# Patient Record
Sex: FEMALE | Race: WHITE | Employment: UNEMPLOYED
[De-identification: names, ages, dates, MRNs, and addresses within clinical notes are randomized per-mention and may not be internally consistent; named-entity substitution may affect disease eponyms.]

---

## 2023-09-14 ENCOUNTER — OFFICE VISIT (OUTPATIENT)
Dept: FAMILY MEDICINE CLINIC | Facility: CLINIC | Age: 33
End: 2023-09-14

## 2023-09-14 VITALS
WEIGHT: 139.6 LBS | OXYGEN SATURATION: 98 % | SYSTOLIC BLOOD PRESSURE: 104 MMHG | HEIGHT: 60 IN | BODY MASS INDEX: 27.41 KG/M2 | TEMPERATURE: 98.3 F | DIASTOLIC BLOOD PRESSURE: 72 MMHG | HEART RATE: 96 BPM

## 2023-09-14 DIAGNOSIS — Z72.51 HIGH RISK SEXUAL BEHAVIOR, UNSPECIFIED TYPE: ICD-10-CM

## 2023-09-14 DIAGNOSIS — M79.641 BILATERAL HAND PAIN: Primary | ICD-10-CM

## 2023-09-14 DIAGNOSIS — R10.31 RIGHT LOWER QUADRANT ABDOMINAL PAIN: ICD-10-CM

## 2023-09-14 DIAGNOSIS — M79.642 BILATERAL HAND PAIN: Primary | ICD-10-CM

## 2023-09-14 DIAGNOSIS — F43.10 PTSD (POST-TRAUMATIC STRESS DISORDER): ICD-10-CM

## 2023-09-14 DIAGNOSIS — E04.1 THYROID NODULE: ICD-10-CM

## 2023-09-14 DIAGNOSIS — M79.642 BILATERAL HAND PAIN: ICD-10-CM

## 2023-09-14 DIAGNOSIS — M79.641 BILATERAL HAND PAIN: ICD-10-CM

## 2023-09-14 LAB
ALBUMIN SERPL-MCNC: 4.2 G/DL (ref 3.5–5)
ALBUMIN/GLOB SERPL: 1.4 (ref 0.4–1.6)
ALP SERPL-CCNC: 93 U/L (ref 50–136)
ALT SERPL-CCNC: 21 U/L (ref 12–65)
ANION GAP SERPL CALC-SCNC: 6 MMOL/L (ref 2–11)
AST SERPL-CCNC: 17 U/L (ref 15–37)
BASOPHILS # BLD: 0 K/UL (ref 0–0.2)
BASOPHILS NFR BLD: 0 % (ref 0–2)
BILIRUB SERPL-MCNC: 0.5 MG/DL (ref 0.2–1.1)
BUN SERPL-MCNC: 11 MG/DL (ref 6–23)
CALCIUM SERPL-MCNC: 9.2 MG/DL (ref 8.3–10.4)
CHLORIDE SERPL-SCNC: 107 MMOL/L (ref 101–110)
CO2 SERPL-SCNC: 28 MMOL/L (ref 21–32)
CREAT SERPL-MCNC: 0.8 MG/DL (ref 0.6–1)
DIFFERENTIAL METHOD BLD: ABNORMAL
EOSINOPHIL # BLD: 0.1 K/UL (ref 0–0.8)
EOSINOPHIL NFR BLD: 3 % (ref 0.5–7.8)
ERYTHROCYTE [DISTWIDTH] IN BLOOD BY AUTOMATED COUNT: 11.7 % (ref 11.9–14.6)
GLOBULIN SER CALC-MCNC: 3.1 G/DL (ref 2.8–4.5)
GLUCOSE SERPL-MCNC: 92 MG/DL (ref 65–100)
HCT VFR BLD AUTO: 40.5 % (ref 35.8–46.3)
HGB BLD-MCNC: 13.6 G/DL (ref 11.7–15.4)
HIV 1+2 AB+HIV1 P24 AG SERPL QL IA: NONREACTIVE
HIV 1/2 RESULT COMMENT: NORMAL
IMM GRANULOCYTES # BLD AUTO: 0 K/UL (ref 0–0.5)
IMM GRANULOCYTES NFR BLD AUTO: 0 % (ref 0–5)
LYMPHOCYTES # BLD: 1 K/UL (ref 0.5–4.6)
LYMPHOCYTES NFR BLD: 20 % (ref 13–44)
MCH RBC QN AUTO: 31.1 PG (ref 26.1–32.9)
MCHC RBC AUTO-ENTMCNC: 33.6 G/DL (ref 31.4–35)
MCV RBC AUTO: 92.5 FL (ref 82–102)
MONOCYTES # BLD: 0.2 K/UL (ref 0.1–1.3)
MONOCYTES NFR BLD: 5 % (ref 4–12)
NEUTS SEG # BLD: 3.6 K/UL (ref 1.7–8.2)
NEUTS SEG NFR BLD: 72 % (ref 43–78)
NRBC # BLD: 0 K/UL (ref 0–0.2)
PLATELET # BLD AUTO: 214 K/UL (ref 150–450)
PMV BLD AUTO: 10.6 FL (ref 9.4–12.3)
POTASSIUM SERPL-SCNC: 4.2 MMOL/L (ref 3.5–5.1)
PROT SERPL-MCNC: 7.3 G/DL (ref 6.3–8.2)
RBC # BLD AUTO: 4.38 M/UL (ref 4.05–5.2)
SODIUM SERPL-SCNC: 141 MMOL/L (ref 133–143)
TSH W FREE THYROID IF ABNORMAL: 0.67 UIU/ML (ref 0.36–3.74)
WBC # BLD AUTO: 4.9 K/UL (ref 4.3–11.1)

## 2023-09-14 PROCEDURE — 99204 OFFICE O/P NEW MOD 45 MIN: CPT | Performed by: FAMILY MEDICINE

## 2023-09-14 RX ORDER — GABAPENTIN 100 MG/1
100 CAPSULE ORAL 3 TIMES DAILY
Qty: 90 CAPSULE | Refills: 5 | Status: SHIPPED | OUTPATIENT
Start: 2023-09-14 | End: 2024-03-12

## 2023-09-14 RX ORDER — PRAZOSIN HYDROCHLORIDE 2 MG/1
2 CAPSULE ORAL NIGHTLY
COMMUNITY

## 2023-09-14 RX ORDER — BUPRENORPHINE AND NALOXONE 8; 2 MG/1; MG/1
1 FILM, SOLUBLE BUCCAL; SUBLINGUAL 2 TIMES DAILY
COMMUNITY

## 2023-09-14 SDOH — ECONOMIC STABILITY: FOOD INSECURITY: WITHIN THE PAST 12 MONTHS, YOU WORRIED THAT YOUR FOOD WOULD RUN OUT BEFORE YOU GOT MONEY TO BUY MORE.: NEVER TRUE

## 2023-09-14 SDOH — ECONOMIC STABILITY: FOOD INSECURITY: WITHIN THE PAST 12 MONTHS, THE FOOD YOU BOUGHT JUST DIDN'T LAST AND YOU DIDN'T HAVE MONEY TO GET MORE.: NEVER TRUE

## 2023-09-14 SDOH — ECONOMIC STABILITY: HOUSING INSECURITY
IN THE LAST 12 MONTHS, WAS THERE A TIME WHEN YOU DID NOT HAVE A STEADY PLACE TO SLEEP OR SLEPT IN A SHELTER (INCLUDING NOW)?: NO

## 2023-09-14 SDOH — ECONOMIC STABILITY: INCOME INSECURITY: HOW HARD IS IT FOR YOU TO PAY FOR THE VERY BASICS LIKE FOOD, HOUSING, MEDICAL CARE, AND HEATING?: NOT HARD AT ALL

## 2023-09-14 ASSESSMENT — PATIENT HEALTH QUESTIONNAIRE - PHQ9
SUM OF ALL RESPONSES TO PHQ QUESTIONS 1-9: 12
7. TROUBLE CONCENTRATING ON THINGS, SUCH AS READING THE NEWSPAPER OR WATCHING TELEVISION: 3
9. THOUGHTS THAT YOU WOULD BE BETTER OFF DEAD, OR OF HURTING YOURSELF: 0
SUM OF ALL RESPONSES TO PHQ9 QUESTIONS 1 & 2: 3
3. TROUBLE FALLING OR STAYING ASLEEP: 3
5. POOR APPETITE OR OVEREATING: 0
10. IF YOU CHECKED OFF ANY PROBLEMS, HOW DIFFICULT HAVE THESE PROBLEMS MADE IT FOR YOU TO DO YOUR WORK, TAKE CARE OF THINGS AT HOME, OR GET ALONG WITH OTHER PEOPLE: 1
SUM OF ALL RESPONSES TO PHQ QUESTIONS 1-9: 12
2. FEELING DOWN, DEPRESSED OR HOPELESS: 3
4. FEELING TIRED OR HAVING LITTLE ENERGY: 3
1. LITTLE INTEREST OR PLEASURE IN DOING THINGS: 0
6. FEELING BAD ABOUT YOURSELF - OR THAT YOU ARE A FAILURE OR HAVE LET YOURSELF OR YOUR FAMILY DOWN: 0
8. MOVING OR SPEAKING SO SLOWLY THAT OTHER PEOPLE COULD HAVE NOTICED. OR THE OPPOSITE, BEING SO FIGETY OR RESTLESS THAT YOU HAVE BEEN MOVING AROUND A LOT MORE THAN USUAL: 0

## 2023-09-14 ASSESSMENT — ENCOUNTER SYMPTOMS
WHEEZING: 0
DIARRHEA: 0
CONSTIPATION: 0
CHEST TIGHTNESS: 0
ABDOMINAL PAIN: 1
SHORTNESS OF BREATH: 0

## 2023-09-14 NOTE — PROGRESS NOTES
SOFT TISSUE THYROID; Future  3. High risk sexual behavior, unspecified type  -     Hepatitis C Ab, Rflx to Qt by PCR; Future  -     HIV 1/2 Ag/Ab, 4TH Generation,W Rflx Confirm; Future  -     C.trachomatis N.gonorrhoeae DNA; Future  -     RPR Reflex to Titer and TPPA; Future  4. Right lower quadrant abdominal pain  Concern for hernia  -     CT ABDOMEN PELVIS W IV CONTRAST Additional Contrast? Radiologist Recommendation; Future  5. PTSD (post-traumatic stress disorder)  Referral to psychiatry  -     [address] [place] [address] (Psychiatry)       Patient informed, we will call with blood work results within one week. If you have not heard regarding results in over a week, please contact office. You can also review results on [facility].            Prabhu Alvarado MD

## 2023-09-15 LAB
CCP IGA+IGG SERPL IA-ACNC: <1 UNITS (ref 0–19)
RHEUMATOID FACT SER QL LA: NEGATIVE
RPR SER QL: NONREACTIVE

## 2023-09-16 LAB
ANA SER QL: POSITIVE
CENTROMERE B AB SER-ACNC: <0.2 AI (ref 0–0.9)
CHROMATIN AB SERPL-ACNC: <0.2 AI (ref 0–0.9)
DSDNA AB SER-ACNC: 15 IU/ML (ref 0–9)
ENA JO1 AB SER-ACNC: <0.2 AI (ref 0–0.9)
ENA RNP AB SER-ACNC: <0.2 AI (ref 0–0.9)
ENA SCL70 AB SER-ACNC: <0.2 AI (ref 0–0.9)
ENA SM AB SER-ACNC: <0.2 AI (ref 0–0.9)
ENA SS-A AB SER-ACNC: <0.2 AI (ref 0–0.9)
ENA SS-B AB SER-ACNC: <0.2 AI (ref 0–0.9)
Lab: ABNORMAL

## 2023-09-17 LAB
C TRACH RRNA SPEC QL NAA+PROBE: NEGATIVE
N GONORRHOEA RRNA SPEC QL NAA+PROBE: NEGATIVE
SPECIMEN SOURCE: NORMAL

## 2023-09-20 LAB
HCV IGG SERPL QL IA: REACTIVE S/CO RATIO
HCV RNA SERPL NAA+PROBE-ACNC: NORMAL IU/ML
INTERPRETATION: NORMAL
REF LAB TEST REF RANGE: NORMAL

## 2023-10-11 ENCOUNTER — OFFICE VISIT (OUTPATIENT)
Dept: BEHAVIORAL/MENTAL HEALTH CLINIC | Age: 33
End: 2023-10-11

## 2023-10-11 VITALS
TEMPERATURE: 98 F | OXYGEN SATURATION: 98 % | HEART RATE: 87 BPM | HEIGHT: 60 IN | BODY MASS INDEX: 28.51 KG/M2 | SYSTOLIC BLOOD PRESSURE: 129 MMHG | WEIGHT: 145.2 LBS | DIASTOLIC BLOOD PRESSURE: 76 MMHG

## 2023-10-11 DIAGNOSIS — F51.5 NIGHTMARES ASSOCIATED WITH CHRONIC POST-TRAUMATIC STRESS DISORDER: ICD-10-CM

## 2023-10-11 DIAGNOSIS — F41.9 ANXIETY: ICD-10-CM

## 2023-10-11 DIAGNOSIS — R76.8 POSITIVE ANA (ANTINUCLEAR ANTIBODY): ICD-10-CM

## 2023-10-11 DIAGNOSIS — F43.12 NIGHTMARES ASSOCIATED WITH CHRONIC POST-TRAUMATIC STRESS DISORDER: ICD-10-CM

## 2023-10-11 DIAGNOSIS — F51.04 PSYCHOPHYSIOLOGICAL INSOMNIA: ICD-10-CM

## 2023-10-11 DIAGNOSIS — F43.10 PTSD (POST-TRAUMATIC STRESS DISORDER): Primary | ICD-10-CM

## 2023-10-11 RX ORDER — AMITRIPTYLINE HYDROCHLORIDE 25 MG/1
25 TABLET, FILM COATED ORAL
COMMUNITY
Start: 2023-09-28 | End: 2023-10-11 | Stop reason: DRUGHIGH

## 2023-10-11 RX ORDER — AMITRIPTYLINE HYDROCHLORIDE 50 MG/1
50 TABLET, FILM COATED ORAL NIGHTLY
Qty: 30 TABLET | Refills: 3 | Status: SHIPPED | OUTPATIENT
Start: 2023-10-11 | End: 2023-11-10

## 2023-10-11 RX ORDER — FLUOXETINE HYDROCHLORIDE 20 MG/1
20 CAPSULE ORAL EVERY MORNING
Qty: 30 CAPSULE | Refills: 3 | Status: SHIPPED | OUTPATIENT
Start: 2023-10-11 | End: 2023-11-10

## 2023-10-11 RX ORDER — PRAZOSIN HYDROCHLORIDE 2 MG/1
4 CAPSULE ORAL NIGHTLY
Qty: 60 CAPSULE | Refills: 3 | Status: SHIPPED | OUTPATIENT
Start: 2023-10-11 | End: 2023-11-10

## 2023-10-12 PROBLEM — F51.04 PSYCHOPHYSIOLOGICAL INSOMNIA: Status: ACTIVE | Noted: 2023-10-12

## 2023-10-12 PROBLEM — F51.5 NIGHTMARES ASSOCIATED WITH CHRONIC POST-TRAUMATIC STRESS DISORDER: Status: ACTIVE | Noted: 2023-10-12

## 2023-10-12 PROBLEM — F43.12 NIGHTMARES ASSOCIATED WITH CHRONIC POST-TRAUMATIC STRESS DISORDER: Status: ACTIVE | Noted: 2023-10-12

## 2023-10-12 PROBLEM — F41.9 ANXIETY: Status: ACTIVE | Noted: 2023-10-12

## 2023-10-13 ENCOUNTER — TELEPHONE (OUTPATIENT)
Dept: FAMILY MEDICINE CLINIC | Facility: CLINIC | Age: 33
End: 2023-10-13

## 2023-10-13 DIAGNOSIS — M79.642 BILATERAL HAND PAIN: ICD-10-CM

## 2023-10-13 DIAGNOSIS — M79.641 BILATERAL HAND PAIN: ICD-10-CM

## 2023-10-13 RX ORDER — GABAPENTIN 300 MG/1
300 CAPSULE ORAL 3 TIMES DAILY
Qty: 90 CAPSULE | Refills: 5 | Status: SHIPPED | OUTPATIENT
Start: 2023-10-13 | End: 2024-04-10

## 2023-10-13 NOTE — TELEPHONE ENCOUNTER
Called Renate Lim,  for [facility], no answer. Left detailed message on machine, notifying of dose change. [facility] number was left on voicemail in case there are any further questions.

## 2023-10-13 NOTE — TELEPHONE ENCOUNTER
Diagnoses and all orders for this visit:    Bilateral hand pain  Received message from patient's psychiatrist wishing to increase gabapentin dose. Currently prescribed 100 mg TID. Will increase to 300 mg TID. Would recommend titrated as listed below  Week one: 100 mg in Am, 100 mg in PM, and 300 mg at bedtime  Week two: 300 mg in AM, 100 mg in PM and 300 mg at bedtime  Week three 300 mg TID  -     gabapentin (NEURONTIN) 300 MG capsule; Take 1 capsule by mouth 3 times daily for 180 days.  Intended supply: 30 days

## 2023-10-17 ENCOUNTER — HOSPITAL ENCOUNTER (EMERGENCY)
Age: 33
Discharge: HOME | End: 2023-10-17
Attending: STUDENT IN AN ORGANIZED HEALTH CARE EDUCATION/TRAINING PROGRAM

## 2023-10-17 ENCOUNTER — APPOINTMENT (OUTPATIENT)
Dept: CT IMAGING | Age: 33
End: 2023-10-17

## 2023-10-17 VITALS
SYSTOLIC BLOOD PRESSURE: 91 MMHG | DIASTOLIC BLOOD PRESSURE: 67 MMHG | HEIGHT: 60 IN | WEIGHT: 145 LBS | TEMPERATURE: 98 F | HEART RATE: 99 BPM | RESPIRATION RATE: 14 BRPM | BODY MASS INDEX: 28.47 KG/M2 | OXYGEN SATURATION: 100 %

## 2023-10-17 DIAGNOSIS — K92.0 HEMATEMESIS WITH NAUSEA: Primary | ICD-10-CM

## 2023-10-17 LAB
ABO + RH BLD: NORMAL
ALBUMIN SERPL-MCNC: 4 G/DL (ref 3.5–5)
ALBUMIN/GLOB SERPL: 1.1 (ref 0.4–1.6)
ALP SERPL-CCNC: 93 U/L (ref 50–136)
ALT SERPL-CCNC: 20 U/L (ref 12–65)
ANA BY IFA: POSITIVE
ANION GAP SERPL CALC-SCNC: 6 MMOL/L (ref 2–11)
AST SERPL-CCNC: 16 U/L (ref 15–37)
BASOPHILS # BLD: 0 K/UL (ref 0–0.2)
BASOPHILS NFR BLD: 0 % (ref 0–2)
BILIRUB SERPL-MCNC: 0.4 MG/DL (ref 0.2–1.1)
BLOOD GROUP ANTIBODIES SERPL: NORMAL
BUN SERPL-MCNC: 11 MG/DL (ref 6–23)
CALCIUM SERPL-MCNC: 8.8 MG/DL (ref 8.3–10.4)
CHLORIDE SERPL-SCNC: 106 MMOL/L (ref 101–110)
CO2 SERPL-SCNC: 30 MMOL/L (ref 21–32)
CREAT SERPL-MCNC: 0.8 MG/DL (ref 0.6–1)
DIFFERENTIAL METHOD BLD: ABNORMAL
EOSINOPHIL # BLD: 0.3 K/UL (ref 0–0.8)
EOSINOPHIL NFR BLD: 5 % (ref 0.5–7.8)
ERYTHROCYTE [DISTWIDTH] IN BLOOD BY AUTOMATED COUNT: 11.5 % (ref 11.9–14.6)
GLOBULIN SER CALC-MCNC: 3.6 G/DL (ref 2.8–4.5)
GLUCOSE SERPL-MCNC: 98 MG/DL (ref 65–100)
HCG SERPL-ACNC: <1 MIU/ML (ref 0–6)
HCT VFR BLD AUTO: 42.1 % (ref 35.8–46.3)
HGB BLD-MCNC: 14.2 G/DL (ref 11.7–15.4)
IMM GRANULOCYTES # BLD AUTO: 0 K/UL (ref 0–0.5)
IMM GRANULOCYTES NFR BLD AUTO: 0 % (ref 0–5)
INR PPP: 1
LACTATE SERPL-SCNC: 0.6 MMOL/L (ref 0.4–2)
LIPASE SERPL-CCNC: 62 U/L (ref 73–393)
LYMPHOCYTES # BLD: 1.6 K/UL (ref 0.5–4.6)
LYMPHOCYTES NFR BLD: 26 % (ref 13–44)
Lab: ABNORMAL
MAGNESIUM SERPL-MCNC: 2.3 MG/DL (ref 1.8–2.4)
MCH RBC QN AUTO: 30.6 PG (ref 26.1–32.9)
MCHC RBC AUTO-ENTMCNC: 33.7 G/DL (ref 31.4–35)
MCV RBC AUTO: 90.7 FL (ref 82–102)
MONOCYTES # BLD: 0.3 K/UL (ref 0.1–1.3)
MONOCYTES NFR BLD: 5 % (ref 4–12)
NEUTS SEG # BLD: 4 K/UL (ref 1.7–8.2)
NEUTS SEG NFR BLD: 64 % (ref 43–78)
NRBC # BLD: 0 K/UL (ref 0–0.2)
PLATELET # BLD AUTO: 205 K/UL (ref 150–450)
PMV BLD AUTO: 9.5 FL (ref 9.4–12.3)
POTASSIUM SERPL-SCNC: 3.3 MMOL/L (ref 3.5–5.1)
PROT SERPL-MCNC: 7.6 G/DL (ref 6.3–8.2)
PROTHROMBIN TIME: 13.8 SEC (ref 12.6–14.3)
RBC # BLD AUTO: 4.64 M/UL (ref 4.05–5.2)
SODIUM SERPL-SCNC: 142 MMOL/L (ref 133–143)
SPECIMEN EXP DATE BLD: NORMAL
SPECKLED PATTERN: ABNORMAL
WBC # BLD AUTO: 6.3 K/UL (ref 4.3–11.1)

## 2023-10-17 PROCEDURE — 74177 CT ABD & PELVIS W/CONTRAST: CPT

## 2023-10-17 PROCEDURE — 83690 ASSAY OF LIPASE: CPT

## 2023-10-17 PROCEDURE — 96374 THER/PROPH/DIAG INJ IV PUSH: CPT

## 2023-10-17 PROCEDURE — 80053 COMPREHEN METABOLIC PANEL: CPT

## 2023-10-17 PROCEDURE — A4216 STERILE WATER/SALINE, 10 ML: HCPCS | Performed by: STUDENT IN AN ORGANIZED HEALTH CARE EDUCATION/TRAINING PROGRAM

## 2023-10-17 PROCEDURE — 83605 ASSAY OF LACTIC ACID: CPT

## 2023-10-17 PROCEDURE — 86901 BLOOD TYPING SEROLOGIC RH(D): CPT

## 2023-10-17 PROCEDURE — 99285 EMERGENCY DEPT VISIT HI MDM: CPT

## 2023-10-17 PROCEDURE — C9113 INJ PANTOPRAZOLE SODIUM, VIA: HCPCS | Performed by: STUDENT IN AN ORGANIZED HEALTH CARE EDUCATION/TRAINING PROGRAM

## 2023-10-17 PROCEDURE — 6370000000 HC RX 637 (ALT 250 FOR IP): Performed by: STUDENT IN AN ORGANIZED HEALTH CARE EDUCATION/TRAINING PROGRAM

## 2023-10-17 PROCEDURE — 86850 RBC ANTIBODY SCREEN: CPT

## 2023-10-17 PROCEDURE — 85025 COMPLETE CBC W/AUTO DIFF WBC: CPT

## 2023-10-17 PROCEDURE — 84702 CHORIONIC GONADOTROPIN TEST: CPT

## 2023-10-17 PROCEDURE — 6360000002 HC RX W HCPCS: Performed by: STUDENT IN AN ORGANIZED HEALTH CARE EDUCATION/TRAINING PROGRAM

## 2023-10-17 PROCEDURE — 2580000003 HC RX 258: Performed by: STUDENT IN AN ORGANIZED HEALTH CARE EDUCATION/TRAINING PROGRAM

## 2023-10-17 PROCEDURE — 86900 BLOOD TYPING SEROLOGIC ABO: CPT

## 2023-10-17 PROCEDURE — 96375 TX/PRO/DX INJ NEW DRUG ADDON: CPT

## 2023-10-17 PROCEDURE — 6360000004 HC RX CONTRAST MEDICATION: Performed by: STUDENT IN AN ORGANIZED HEALTH CARE EDUCATION/TRAINING PROGRAM

## 2023-10-17 PROCEDURE — 85610 PROTHROMBIN TIME: CPT

## 2023-10-17 PROCEDURE — 83735 ASSAY OF MAGNESIUM: CPT

## 2023-10-17 PROCEDURE — 76937 US GUIDE VASCULAR ACCESS: CPT

## 2023-10-17 RX ORDER — SUCRALFATE ORAL 1 G/10ML
1 SUSPENSION ORAL 4 TIMES DAILY
Qty: 1200 ML | Refills: 0 | Status: SHIPPED | OUTPATIENT
Start: 2023-10-17

## 2023-10-17 RX ORDER — SODIUM CHLORIDE 0.9 % (FLUSH) 0.9 %
10 SYRINGE (ML) INJECTION
Status: COMPLETED | OUTPATIENT
Start: 2023-10-17 | End: 2023-10-17

## 2023-10-17 RX ORDER — ONDANSETRON 2 MG/ML
4 INJECTION INTRAMUSCULAR; INTRAVENOUS ONCE
Status: COMPLETED | OUTPATIENT
Start: 2023-10-17 | End: 2023-10-17

## 2023-10-17 RX ORDER — 0.9 % SODIUM CHLORIDE 0.9 %
1000 INTRAVENOUS SOLUTION INTRAVENOUS ONCE
Status: COMPLETED | OUTPATIENT
Start: 2023-10-17 | End: 2023-10-17

## 2023-10-17 RX ORDER — POTASSIUM CHLORIDE 20 MEQ/1
20 TABLET, EXTENDED RELEASE ORAL ONCE
Status: COMPLETED | OUTPATIENT
Start: 2023-10-17 | End: 2023-10-17

## 2023-10-17 RX ORDER — OMEPRAZOLE 20 MG/1
20 CAPSULE, DELAYED RELEASE ORAL
Qty: 60 CAPSULE | Refills: 0 | Status: SHIPPED | OUTPATIENT
Start: 2023-10-17

## 2023-10-17 RX ORDER — ONDANSETRON 4 MG/1
4 TABLET, FILM COATED ORAL 3 TIMES DAILY PRN
Qty: 15 TABLET | Refills: 0 | Status: SHIPPED | OUTPATIENT
Start: 2023-10-17

## 2023-10-17 RX ORDER — 0.9 % SODIUM CHLORIDE 0.9 %
100 INTRAVENOUS SOLUTION INTRAVENOUS ONCE
Status: DISCONTINUED | OUTPATIENT
Start: 2023-10-17 | End: 2023-10-17 | Stop reason: HOSPADM

## 2023-10-17 RX ADMIN — SODIUM CHLORIDE, PRESERVATIVE FREE 10 ML: 5 INJECTION INTRAVENOUS at 03:14

## 2023-10-17 RX ADMIN — POTASSIUM CHLORIDE 20 MEQ: 1500 TABLET, EXTENDED RELEASE ORAL at 04:23

## 2023-10-17 RX ADMIN — SODIUM CHLORIDE 40 MG: 9 INJECTION INTRAMUSCULAR; INTRAVENOUS; SUBCUTANEOUS at 02:29

## 2023-10-17 RX ADMIN — SODIUM CHLORIDE 1000 ML: 9 INJECTION, SOLUTION INTRAVENOUS at 04:44

## 2023-10-17 RX ADMIN — IOPAMIDOL 100 ML: 755 INJECTION, SOLUTION INTRAVENOUS at 03:13

## 2023-10-17 RX ADMIN — ONDANSETRON 4 MG: 2 INJECTION INTRAMUSCULAR; INTRAVENOUS at 02:26

## 2023-10-17 RX ADMIN — SODIUM CHLORIDE 1000 ML: 9 INJECTION, SOLUTION INTRAVENOUS at 02:33

## 2023-10-17 ASSESSMENT — LIFESTYLE VARIABLES
HOW OFTEN DO YOU HAVE A DRINK CONTAINING ALCOHOL: NEVER
HOW MANY STANDARD DRINKS CONTAINING ALCOHOL DO YOU HAVE ON A TYPICAL DAY: PATIENT DOES NOT DRINK

## 2023-10-17 ASSESSMENT — PAIN - FUNCTIONAL ASSESSMENT: PAIN_FUNCTIONAL_ASSESSMENT: 0-10

## 2023-10-17 ASSESSMENT — PAIN SCALES - GENERAL: PAINLEVEL_OUTOF10: 3

## 2023-10-17 NOTE — PROGRESS NOTES
US Guided PIV access    Called for assistance with IV access. Ultrasound was used to find the vein which was compressible and does not have any features of an artery or nerve bundle. Skin was cleaned and disinfected prior to IV puncture. Under real-time ultrasound guidance, peripheral access was obtained in the left upper arm using 20 G 1.75\" Peripheral IV catheter x 1 attempt. Blood return was present and IV flushed without difficulty. IV dressing applied, no immediate complications noted, and patient tolerated the procedure well.

## 2023-10-17 NOTE — ED TRIAGE NOTES
Pt bib EMS from home for nausea, vomiting, and abdominal pain x2 days. Reports coffee ground emesis x 1 tonight. Hx of stomach ulcers. Has been taking increased amounts of ibuprofen for mouth pain.
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
## 2023-11-07 ENCOUNTER — OFFICE VISIT (OUTPATIENT)
Age: 33
End: 2023-11-07

## 2023-11-07 VITALS
WEIGHT: 156 LBS | DIASTOLIC BLOOD PRESSURE: 82 MMHG | HEART RATE: 105 BPM | TEMPERATURE: 97.6 F | SYSTOLIC BLOOD PRESSURE: 102 MMHG | BODY MASS INDEX: 30.63 KG/M2 | OXYGEN SATURATION: 98 % | HEIGHT: 60 IN | RESPIRATION RATE: 16 BRPM

## 2023-11-07 DIAGNOSIS — K59.00 CONSTIPATION, UNSPECIFIED CONSTIPATION TYPE: Primary | ICD-10-CM

## 2023-11-07 DIAGNOSIS — K92.0 HEMATEMESIS WITH NAUSEA: ICD-10-CM

## 2023-11-07 PROCEDURE — 99204 OFFICE O/P NEW MOD 45 MIN: CPT | Performed by: INTERNAL MEDICINE

## 2023-11-07 NOTE — PROGRESS NOTES
Lazarus Valenzuela (:  1990) is a 35 y.o. female new patient referred to our office for evaluation of the following chief complaint(s):  Hematemesis/nausea          ASSESSMENT/PLAN:  34 yo F presents to the [facility] clinic for further evaluation of hematemesis and nausea. 1) Hematemesis- acute symptoms in the setting of nausea/vomiting/pain. Normal labs and CT. Possible esophagitis or MW tear, history of PUD but less likely. No ongoing issues at this time. Appears to be doing well on her Prilosec. Given the facttha    2) History of HCV- negative viral load on recent labs. States that she was treated in the past for this. Normal LFTs. Imagine negative for any cirrhosis. 3) Constipation- possible IBS vs CIC, no alarm symptoms and chronic, has a BM around 2x per week     Plan  Continue with Prilosec at least once a day, prior to breakfast    Anti-reflux measures    Limit carb intake    Benefiber daily, probiotic over the counter can help with GI issues, Miralax 17gm daily can help with constipation     CT- normal aside from non obstructing right renal stones     Subjective   SUBJECTIVE/OBJECTIVE  Lazarus Valenzuela is a 35y.o. year old female presents to the GI clinic for further evaluation of hematemesis and nausea. Patient was seen this past month in the Ed with complaint of pain, nausea, and vomiting, as well as hematemesis. Imaging was unremarkable for any Gi pathology. She has been doing well at this time on her PPI. States chronic issues with constipation but no weight loss, nausea, vomiting, pain, changes in her bowel habits, bleeding, etc. History of HCV with negative viral load and states she was treated for this. She feels that her symptoms may have been from eating red meat, as she hasnt consumed this since she was a child. No prior colonoscopy or EGD. She is also concerned about having a hernia under her belly button     No past medical history on file.     Past Surgical History:   Procedure Laterality

## 2023-11-07 NOTE — PATIENT INSTRUCTIONS
Continue with Prilosec at least once a day, prior to breakfast    Anti-reflux measures    Limit carb intake    Benefiber daily, probiotic over the counter can help with GI issues, Miralax 17gm daily can help with constipation    Limit red meat if this is causing GI upset

## 2023-11-13 ENCOUNTER — OFFICE VISIT (OUTPATIENT)
Dept: BEHAVIORAL/MENTAL HEALTH CLINIC | Age: 33
End: 2023-11-13

## 2023-11-13 VITALS
SYSTOLIC BLOOD PRESSURE: 98 MMHG | HEART RATE: 122 BPM | BODY MASS INDEX: 29.84 KG/M2 | OXYGEN SATURATION: 98 % | WEIGHT: 152 LBS | HEIGHT: 60 IN | DIASTOLIC BLOOD PRESSURE: 64 MMHG

## 2023-11-13 DIAGNOSIS — F43.12 NIGHTMARES ASSOCIATED WITH CHRONIC POST-TRAUMATIC STRESS DISORDER: ICD-10-CM

## 2023-11-13 DIAGNOSIS — F41.9 ANXIETY: ICD-10-CM

## 2023-11-13 DIAGNOSIS — F51.5 NIGHTMARES ASSOCIATED WITH CHRONIC POST-TRAUMATIC STRESS DISORDER: ICD-10-CM

## 2023-11-13 DIAGNOSIS — F43.10 PTSD (POST-TRAUMATIC STRESS DISORDER): Primary | ICD-10-CM

## 2023-11-13 DIAGNOSIS — F51.04 PSYCHOPHYSIOLOGICAL INSOMNIA: ICD-10-CM

## 2023-11-13 PROCEDURE — 99215 OFFICE O/P EST HI 40 MIN: CPT | Performed by: NURSE PRACTITIONER

## 2023-11-13 RX ORDER — AMITRIPTYLINE HYDROCHLORIDE 75 MG/1
75 TABLET ORAL NIGHTLY
Qty: 30 TABLET | Refills: 3 | Status: SHIPPED | OUTPATIENT
Start: 2023-11-13 | End: 2023-12-13

## 2023-11-13 RX ORDER — FLUOXETINE HYDROCHLORIDE 40 MG/1
40 CAPSULE ORAL EVERY MORNING
Qty: 30 CAPSULE | Refills: 3 | Status: SHIPPED | OUTPATIENT
Start: 2023-11-13 | End: 2023-12-13

## 2023-11-13 RX ORDER — PRAZOSIN HYDROCHLORIDE 5 MG/1
5 CAPSULE ORAL NIGHTLY
Qty: 30 CAPSULE | Refills: 3 | Status: SHIPPED | OUTPATIENT
Start: 2023-11-13 | End: 2023-12-13

## 2023-11-13 ASSESSMENT — PATIENT HEALTH QUESTIONNAIRE - PHQ9
1. LITTLE INTEREST OR PLEASURE IN DOING THINGS: 0
SUM OF ALL RESPONSES TO PHQ QUESTIONS 1-9: 0
2. FEELING DOWN, DEPRESSED OR HOPELESS: 0
SUM OF ALL RESPONSES TO PHQ9 QUESTIONS 1 & 2: 0
SUM OF ALL RESPONSES TO PHQ QUESTIONS 1-9: 0

## 2023-11-13 NOTE — PROGRESS NOTES
OUTPATIENT PSYCHIATRIC RETURN VISIT PROGRESS NOTE    Date of Service: 11/13/2023     Identification    Sruthi Garrison is a 35 y.o.  with a past psychiatric history of CPTSD, anxiety, opioid dependence , nightmares who presents today for a psychiatric follow up appointment. CC:  Routine medication management follow up. No chief complaint on file. Subjective / Interval History:    Pt comes to clinic today and reports that she is doing very well at [employer] Working on art therapy with expressive arts therapy. She is struggling with the therapy piece. Continues in therapy but thinks she needs more support. Still waking up middle of night,Still having nightmares. Will increase amitriptyline to 75 mg HS, increase Prazosin to 4 mg HS. Continue   Pt has been medication compliant and denies any side-effects. Pt denies SI, HI and AVH. Does not endorse any manic or psychotic symptoms. Psychiatric Review Of Systems:  Sleep: nightime awakenings  Appetite: ok  Current suicidal/homicidal ideations: Denies SI/ HI  Current auditory/visual hallucinations: Denies     Medications:    Current Outpatient Medications:     omeprazole (PRILOSEC) 20 MG delayed release capsule, Take 1 capsule by mouth 2 times daily (before meals), Disp: 60 capsule, Rfl: 0    sucralfate (CARAFATE) 1 GM/10ML suspension, Take 10 mLs by mouth 4 times daily, Disp: 1200 mL, Rfl: 0    ondansetron (ZOFRAN) 4 MG tablet, Take 1 tablet by mouth 3 times daily as needed for Nausea or Vomiting, Disp: 15 tablet, Rfl: 0    gabapentin (NEURONTIN) 300 MG capsule, Take 1 capsule by mouth 3 times daily for 180 days.  Intended supply: 30 days, Disp: 90 capsule, Rfl: 5    FLUoxetine (PROZAC) 20 MG capsule, Take 1 capsule by mouth every morning, Disp: 30 capsule, Rfl: 3    prazosin (MINIPRESS) 2 MG capsule, Take 2 capsules by mouth nightly, Disp: 60 capsule, Rfl: 3    amitriptyline (ELAVIL) 50 MG tablet, Take 1 tablet by mouth nightly, Disp: 30 tablet, Rfl: 3

## 2023-11-16 ENCOUNTER — OFFICE VISIT (OUTPATIENT)
Dept: FAMILY MEDICINE CLINIC | Facility: CLINIC | Age: 33
End: 2023-11-16

## 2023-11-16 VITALS
TEMPERATURE: 98.2 F | SYSTOLIC BLOOD PRESSURE: 104 MMHG | HEIGHT: 60 IN | BODY MASS INDEX: 30.04 KG/M2 | WEIGHT: 153 LBS | DIASTOLIC BLOOD PRESSURE: 68 MMHG | OXYGEN SATURATION: 99 % | HEART RATE: 120 BPM

## 2023-11-16 DIAGNOSIS — K21.9 GASTROESOPHAGEAL REFLUX DISEASE, UNSPECIFIED WHETHER ESOPHAGITIS PRESENT: ICD-10-CM

## 2023-11-16 DIAGNOSIS — R76.8 POSITIVE ANA (ANTINUCLEAR ANTIBODY): Primary | ICD-10-CM

## 2023-11-16 DIAGNOSIS — M79.642 BILATERAL HAND PAIN: ICD-10-CM

## 2023-11-16 DIAGNOSIS — M79.641 BILATERAL HAND PAIN: ICD-10-CM

## 2023-11-16 DIAGNOSIS — Z86.19 HISTORY OF HEPATITIS B: ICD-10-CM

## 2023-11-16 DIAGNOSIS — E04.1 THYROID NODULE: ICD-10-CM

## 2023-11-16 DIAGNOSIS — I73.00 RAYNAUD'S DISEASE WITHOUT GANGRENE: ICD-10-CM

## 2023-11-16 LAB — HBV SURFACE AG SER QL: NONREACTIVE

## 2023-11-16 PROCEDURE — 99214 OFFICE O/P EST MOD 30 MIN: CPT | Performed by: FAMILY MEDICINE

## 2023-11-16 RX ORDER — AMLODIPINE BESYLATE 2.5 MG/1
2.5 TABLET ORAL DAILY
Qty: 90 TABLET | Refills: 1 | Status: SHIPPED | OUTPATIENT
Start: 2023-11-16

## 2023-11-16 RX ORDER — OMEPRAZOLE 20 MG/1
CAPSULE, DELAYED RELEASE ORAL
Qty: 60 CAPSULE | Refills: 3 | Status: SHIPPED | OUTPATIENT
Start: 2023-11-16

## 2023-11-16 RX ORDER — GABAPENTIN 300 MG/1
300 CAPSULE ORAL 3 TIMES DAILY
Qty: 90 CAPSULE | Refills: 5 | Status: SHIPPED | OUTPATIENT
Start: 2023-11-16 | End: 2024-05-14

## 2023-11-16 RX ORDER — NALOXONE HYDROCHLORIDE 4 MG/.1ML
SPRAY NASAL
COMMUNITY
Start: 2023-09-14

## 2023-11-16 ASSESSMENT — ENCOUNTER SYMPTOMS
CHEST TIGHTNESS: 0
DIARRHEA: 0
CONSTIPATION: 1
NAUSEA: 0
SHORTNESS OF BREATH: 0
COUGH: 0
ABDOMINAL PAIN: 1
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
## 2023-11-17 LAB
HBV DNA SERPL NAA+PROBE-ACNC: <10 IU/ML
HBV DNA SERPL NAA+PROBE-LOG IU: NORMAL LOG10 IU/ML
HBV SURFACE AB SERPL IA-ACNC: 41.21 MIU/ML
TEST INFORMATION: NORMAL

## 2023-11-18 LAB — HBV CORE IGM SERPL QL IA: NEGATIVE

## 2023-11-24 ASSESSMENT — SOCIAL DETERMINANTS OF HEALTH (SDOH)
WITHIN THE LAST YEAR, HAVE TO BEEN RAPED OR FORCED TO HAVE ANY KIND OF SEXUAL ACTIVITY BY YOUR PARTNER OR EX-PARTNER?: YES
WITHIN THE LAST YEAR, HAVE YOU BEEN AFRAID OF YOUR PARTNER OR EX-PARTNER?: YES
WITHIN THE LAST YEAR, HAVE YOU BEEN HUMILIATED OR EMOTIONALLY ABUSED IN OTHER WAYS BY YOUR PARTNER OR EX-PARTNER?: YES
WITHIN THE LAST YEAR, HAVE YOU BEEN KICKED, HIT, SLAPPED, OR OTHERWISE PHYSICALLY HURT BY YOUR PARTNER OR EX-PARTNER?: YES

## 2023-11-27 ENCOUNTER — OFFICE VISIT (OUTPATIENT)
Dept: OBGYN CLINIC | Age: 33
End: 2023-11-27

## 2023-11-27 VITALS
TEMPERATURE: 98.1 F | DIASTOLIC BLOOD PRESSURE: 86 MMHG | HEIGHT: 60 IN | WEIGHT: 157 LBS | SYSTOLIC BLOOD PRESSURE: 120 MMHG | BODY MASS INDEX: 30.82 KG/M2

## 2023-11-27 DIAGNOSIS — N89.8 VAGINAL ODOR: ICD-10-CM

## 2023-11-27 DIAGNOSIS — Z87.42 HISTORY OF ABNORMAL CERVICAL PAP SMEAR: ICD-10-CM

## 2023-11-27 DIAGNOSIS — Z01.419 WELL WOMAN EXAM WITH ROUTINE GYNECOLOGICAL EXAM: ICD-10-CM

## 2023-11-27 DIAGNOSIS — R10.2 PELVIC PAIN: Primary | ICD-10-CM

## 2023-11-27 DIAGNOSIS — N89.8 VAGINAL DISCHARGE: ICD-10-CM

## 2023-11-27 DIAGNOSIS — F43.10 PTSD (POST-TRAUMATIC STRESS DISORDER): ICD-10-CM

## 2023-11-27 DIAGNOSIS — Z11.3 SCREENING EXAMINATION FOR STD (SEXUALLY TRANSMITTED DISEASE): ICD-10-CM

## 2023-11-27 PROCEDURE — 99204 OFFICE O/P NEW MOD 45 MIN: CPT | Performed by: OBSTETRICS & GYNECOLOGY

## 2023-11-27 RX ORDER — METRONIDAZOLE 500 MG/1
500 TABLET ORAL 2 TIMES DAILY
Qty: 14 TABLET | Refills: 0 | Status: SHIPPED | OUTPATIENT
Start: 2023-11-27 | End: 2023-12-04

## 2023-11-27 ASSESSMENT — ENCOUNTER SYMPTOMS
RESPIRATORY NEGATIVE: 1
GASTROINTESTINAL NEGATIVE: 1

## 2023-11-27 NOTE — PROGRESS NOTES
capsule Take bid before meals prn acid reflux (Patient not taking: Reported on 11/27/2023) 60 capsule 3    sucralfate (CARAFATE) 1 GM/10ML suspension Take 10 mLs by mouth 4 times daily (Patient not taking: Reported on 11/27/2023) 1200 mL 0     No facility-administered encounter medications on file as of 11/27/2023.          No Known Allergies      Family History   Problem Relation Age of Onset    No Known Problems Paternal Grandfather     No Known Problems Paternal Grandmother     No Known Problems Maternal Grandmother     No Known Problems Maternal Grandfather     No Known Problems Father     No Known Problems Mother     No Known Problems Brother     No Known Problems Sister     No Known Problems Other     Breast Cancer Neg Hx     Colon Cancer Neg Hx          Social History     Socioeconomic History    Marital status: Single   Tobacco Use    Smoking status: Every Day     Packs/day: 0.50     Years: 22.00     Additional pack years: 0.00     Total pack years: 11.00     Types: Cigarettes     Start date: 2001     Passive exposure: Never    Smokeless tobacco: Never   Vaping Use    Vaping Use: Never used   Substance and Sexual Activity    Alcohol use: Never    Drug use: Not Currently     Types: Methamphetamines (Crystal Meth), Heroin     Comment: 4 years clean from heroine; 1 month clean from meth    Sexual activity: Not Currently     Partners: Male     Social Determinants of Health     Financial Resource Strain: Low Risk  (9/14/2023)    Overall Financial Resource Strain (CARDIA)     Difficulty of Paying Living Expenses: Not hard at all   Food Insecurity: No Food Insecurity (9/14/2023)    Hunger Vital Sign     Worried About Running Out of Food in the Last Year: Never true     Ran Out of Food in the Last Year: Never true   Transportation Needs: Unknown (9/14/2023)    PRAPARE - Transportation     Lack of Transportation (Non-Medical): No   Housing Stability: Unknown (9/14/2023)    Housing Stability Vital Sign     Unstable

## 2023-11-28 LAB
HBV SURFACE AG SER QL: NONREACTIVE
HCV AB SER QL: REACTIVE
HIV 1+2 AB+HIV1 P24 AG SERPL QL IA: NONREACTIVE
HIV 1/2 RESULT COMMENT: NORMAL
RPR SER QL: NONREACTIVE

## 2023-11-30 LAB
A VAGINAE DNA VAG QL NAA+PROBE: NORMAL SCORE
BVAB2 DNA VAG QL NAA+PROBE: NORMAL SCORE
C ALBICANS DNA VAG QL NAA+PROBE: NEGATIVE
C GLABRATA DNA VAG QL NAA+PROBE: NEGATIVE
C TRACH RRNA SPEC QL NAA+PROBE: NEGATIVE
MEGA1 DNA VAG QL NAA+PROBE: NORMAL SCORE
N GONORRHOEA RRNA SPEC QL NAA+PROBE: NEGATIVE
SPECIMEN SOURCE: NORMAL
T VAGINALIS RRNA SPEC QL NAA+PROBE: NEGATIVE

## 2023-12-05 LAB
COLLECTION METHOD: ABNORMAL
CYTOLOGIST CVX/VAG CYTO: ABNORMAL
CYTOLOGY CVX/VAG DOC THIN PREP: ABNORMAL
HPV APTIMA: NEGATIVE
HPV GENOTYPE REFLEX: ABNORMAL
Lab: ABNORMAL
PAP SOURCE: ABNORMAL
PATH REPORT.FINAL DX SPEC: ABNORMAL
PATHOLOGIST CVX/VAG CYTO: ABNORMAL
PATHOLOGIST PROVIDED ICD: ABNORMAL
RECOM F/U CVX/VAG CYTO: ABNORMAL
STAT OF ADQ CVX/VAG CYTO-IMP: ABNORMAL

## 2024-01-12 ENCOUNTER — HOSPITAL ENCOUNTER (OUTPATIENT)
Dept: GENERAL RADIOLOGY | Age: 34
Discharge: HOME | End: 2024-01-12

## 2024-01-12 DIAGNOSIS — M79.641 BILATERAL HAND PAIN: ICD-10-CM

## 2024-01-12 DIAGNOSIS — M79.642 BILATERAL HAND PAIN: ICD-10-CM

## 2024-01-12 PROCEDURE — 73130 X-RAY EXAM OF HAND: CPT

## 2024-01-19 ENCOUNTER — OFFICE VISIT (OUTPATIENT)
Dept: FAMILY MEDICINE CLINIC | Facility: CLINIC | Age: 34
End: 2024-01-19

## 2024-01-19 VITALS
SYSTOLIC BLOOD PRESSURE: 118 MMHG | BODY MASS INDEX: 33.4 KG/M2 | TEMPERATURE: 98.2 F | WEIGHT: 171 LBS | OXYGEN SATURATION: 98 % | HEART RATE: 128 BPM | DIASTOLIC BLOOD PRESSURE: 72 MMHG

## 2024-01-19 DIAGNOSIS — M79.641 BILATERAL HAND PAIN: Primary | ICD-10-CM

## 2024-01-19 DIAGNOSIS — M79.642 BILATERAL HAND PAIN: Primary | ICD-10-CM

## 2024-01-19 DIAGNOSIS — R76.8 POSITIVE ANA (ANTINUCLEAR ANTIBODY): ICD-10-CM

## 2024-01-19 DIAGNOSIS — R21 RASH AND NONSPECIFIC SKIN ERUPTION: ICD-10-CM

## 2024-01-19 PROCEDURE — 99214 OFFICE O/P EST MOD 30 MIN: CPT | Performed by: FAMILY MEDICINE

## 2024-01-19 RX ORDER — PREDNISONE 5 MG/1
TABLET ORAL
Qty: 1 EACH | Refills: 0 | Status: SHIPPED | OUTPATIENT
Start: 2024-01-19

## 2024-01-19 RX ORDER — TRIAMCINOLONE ACETONIDE 1 MG/G
CREAM TOPICAL
Qty: 30 G | Refills: 2 | Status: SHIPPED | OUTPATIENT
Start: 2024-01-19

## 2024-01-19 ASSESSMENT — PATIENT HEALTH QUESTIONNAIRE - PHQ9
1. LITTLE INTEREST OR PLEASURE IN DOING THINGS: 0
SUM OF ALL RESPONSES TO PHQ QUESTIONS 1-9: 0
2. FEELING DOWN, DEPRESSED OR HOPELESS: 0
SUM OF ALL RESPONSES TO PHQ QUESTIONS 1-9: 0
SUM OF ALL RESPONSES TO PHQ QUESTIONS 1-9: 0
SUM OF ALL RESPONSES TO PHQ9 QUESTIONS 1 & 2: 0
SUM OF ALL RESPONSES TO PHQ QUESTIONS 1-9: 0

## 2024-01-19 ASSESSMENT — ENCOUNTER SYMPTOMS
COUGH: 0
SHORTNESS OF BREATH: 0

## 2024-01-19 NOTE — PROGRESS NOTES
triamcinolone (KENALOG) 0.1 % cream; Apply topically 2 times daily., Disp-30 g, R-2, Normal  3. Positive SRINIVASA (antinuclear antibody)   Given information to call and schedule with rheumatology.  Concern for underlying rheumatologic disease contributing to symptoms in hands and ankles.     Patient informed, we will call with blood work results within one week.  If you have not heard regarding results in over a week, please contact office.  You can also review results on [facility].           Chencho Jackson MD

## 2024-01-22 ENCOUNTER — OFFICE VISIT (OUTPATIENT)
Dept: RHEUMATOLOGY | Age: 34
End: 2024-01-22

## 2024-01-22 VITALS
BODY MASS INDEX: 32.98 KG/M2 | DIASTOLIC BLOOD PRESSURE: 86 MMHG | SYSTOLIC BLOOD PRESSURE: 124 MMHG | HEIGHT: 60 IN | WEIGHT: 168 LBS | HEART RATE: 113 BPM

## 2024-01-22 DIAGNOSIS — I73.00 RAYNAUD'S DISEASE WITHOUT GANGRENE: ICD-10-CM

## 2024-01-22 DIAGNOSIS — R76.8 POSITIVE ANA (ANTINUCLEAR ANTIBODY): Primary | ICD-10-CM

## 2024-01-22 DIAGNOSIS — R76.8 POSITIVE ANA (ANTINUCLEAR ANTIBODY): ICD-10-CM

## 2024-01-22 PROCEDURE — 99244 OFF/OP CNSLTJ NEW/EST MOD 40: CPT | Performed by: INTERNAL MEDICINE

## 2024-01-22 ASSESSMENT — ROUTINE ASSESSMENT OF PATIENT INDEX DATA (RAPID3)
ON A SCALE OF ONE TO TEN, HOW MUCH PAIN HAVE YOU HAD BECAUSE OF YOUR CONDITION OVER THE PAST WEEK?: 8
ON A SCALE OF ONE TO TEN, HOW DIFFICULT WAS IT FOR YOU TO COMPLETE THE LISTED DAILY PHYSICAL TASKS OVER THE LAST WEEK: 0.8
ON A SCALE OF ONE TO TEN, HOW MUCH OF A PROBLEM HAS UNUSUAL FATIGUE OR TIREDNESS BEEN FOR YOU OVER THE PAST WEEK?: 9
WHEN YOU AWAKENED IN THE MORNING OVER THE LAST WEEK, PLEASE INDICATE THE AMOUNT OF TIME IT TAKES UNTIL YOU ARE AS LIMBER AS YOU WILL BE FOR THE DAY: 1 HOUR
ON A SCALE OF ONE TO TEN, CONSIDERING ALL THE WAYS IN WHICH ILLNESS AND HEALTH CONDITIONS MAY AFFECT YOU AT THIS TIME, PLEASE INDICATE BELOW HOW YOU ARE DOING:: 7

## 2024-01-22 ASSESSMENT — JOINT PAIN
TOTAL NUMBER OF SWOLLEN JOINTS: 8
TOTAL NUMBER OF TENDER JOINTS: 12

## 2024-01-22 NOTE — PROGRESS NOTES
[facility] Rheumatology  Lisa Calhoun M.D.  [address] [place] [address]  Office : [phone], Fax: [phone]      2024    Dear ,    Thank you for asking me to assist in the care of your patient Tiffanie Tejeda who was seen in my office on 2024 for evaluation of joint pain with a positive SRINIVASA. I have included the full consultation note below.  I will continue to follow your patient and will forward all future office visit notes to you.  If you have any questions or concerns about any aspect of your patient's care, please do not hesitate to contact me.    I look forward to continuing to care for this patient with you.    Sincerely,    Dr. Calhoun          RHEUMATOLOGY INITIAL CONSULTATION NOTE  Date of Visit:  2024 8:42 AM    Patient Information:  Name:  Tiffanie Tejeda  :  1990  Age:  33 y.o.   Gender:  female    PHYSICIAN REQUESTING CONSULTATION:  Dr. Jackson.    Chief Complaint:  Chief Complaint   Patient presents with    Consultation    Abnormal Test Results    Joint Pain     History of Present Illness:  Tiffanie Tejeda is a 33 y.o. female who was referred for evaluation of joint pain with a positive SRINIVASA of 1:160 with a positive anti-dsDNA antibody titer of 15.  On talking to patient today she states that she has had ongoing joint pain for several years now with the pain in her hands has gotten worse in the last year.  Her current joint complaints are as mentioned below.    Medical records were thoroughly reviewed and history was also obtained from patient:    Overall, she says she is feeling \"poor\".    Pain: 8/10  Location:  Bilateral hand and feet pain and swelling with symptoms worse in the left than the right. Has swelling, warmth and redness on the dorsum and in the knuckles of both the hands. Bilateral shoulder pain. Stiffness lasts for an hour or so.  Some para spinal muscle pain. Some neck stiffness with no pain with neck ROM. No

## 2024-01-23 LAB
C3 SERPL-MCNC: 124 MG/DL (ref 82–167)
C4 SERPL-MCNC: 18 MG/DL (ref 12–38)
CARDIOLIPIN IGA SER IA-ACNC: <9 APL U/ML (ref 0–11)
CARDIOLIPIN IGG SER IA-ACNC: <9 GPL U/ML (ref 0–14)
CARDIOLIPIN IGM SER IA-ACNC: <9 MPL U/ML (ref 0–12)
DSDNA AB SER-ACNC: 16 IU/ML (ref 0–9)

## 2024-01-24 LAB
APTT SCREEN TO CONFIRM RATIO: 1.08 RATIO (ref 0–1.34)
CONFIRM APTT/NORMAL: 37.2 SEC (ref 0–47.6)
LA 2 SCREEN W REFLEX-IMP: NORMAL
SCREEN APTT: 31 SEC (ref 0–43.5)
SCREEN DRVVT: 34.3 SEC (ref 0–47)
THROMBIN TIME: 17.3 SEC (ref 0–23)

## 2024-01-25 ENCOUNTER — HOSPITAL ENCOUNTER (OUTPATIENT)
Dept: ULTRASOUND IMAGING | Age: 34
Discharge: HOME | End: 2024-01-25

## 2024-01-25 DIAGNOSIS — E04.1 THYROID NODULE: ICD-10-CM

## 2024-01-25 DIAGNOSIS — E04.1 THYROID NODULE: Primary | ICD-10-CM

## 2024-01-25 DIAGNOSIS — F41.0 PANIC DISORDER: Primary | ICD-10-CM

## 2024-01-25 PROCEDURE — 76536 US EXAM OF HEAD AND NECK: CPT

## 2024-01-25 RX ORDER — LORAZEPAM 1 MG/1
TABLET ORAL
Qty: 1 TABLET | Refills: 0 | Status: SHIPPED | OUTPATIENT
Start: 2024-01-25 | End: 2024-02-09
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
## 2024-02-01 ENCOUNTER — OFFICE VISIT (OUTPATIENT)
Dept: ENDOCRINOLOGY | Age: 34
End: 2024-02-01

## 2024-02-01 VITALS
OXYGEN SATURATION: 98 % | SYSTOLIC BLOOD PRESSURE: 125 MMHG | WEIGHT: 171 LBS | BODY MASS INDEX: 33.4 KG/M2 | DIASTOLIC BLOOD PRESSURE: 80 MMHG | HEART RATE: 106 BPM

## 2024-02-01 DIAGNOSIS — E04.1 THYROID NODULE: Primary | ICD-10-CM

## 2024-02-01 ASSESSMENT — ENCOUNTER SYMPTOMS
TROUBLE SWALLOWING: 1
VOICE CHANGE: 0
DIARRHEA: 1
CONSTIPATION: 0

## 2024-02-01 NOTE — PATIENT INSTRUCTIONS
THYROID FINE NEEDLE BIOPSY AFTER-CARE INSTRUCTIONS    WHAT YOU SHOULD KNOW:  Fine needle aspiration biopsy is a simple in-office procedure to remove microscopic amounts of tissue from nodules/lumps in the thyroid gland.  This tissue will be sent to an expert thyroid pathologist for evaluation.  AFTER YOU LEAVE:  A small amount of bruising, swelling, and tenderness after the biopsy is normal and expected.  You may use cold compresses for relief of symptoms.  You may also use ibuprofen (Motrin) or acetaminophen (Tylenol) for relief of tenderness.  Notify our office if you experience any of the following:  Persistent pain/discomfort at site of biopsy  Swollen lump at site of biopsy  Difficulty swallowing  WHAT WILL HAPPEN NEXT:  The specimens from our office will be sent to an expert thyroid pathologist.  The pathologist will evaluate the tissue sampled at the time of biopsy.   The pathologist will make two major determinations.    First, he/she will determine if there is enough tissue to make a diagnosis.  Usually, the specimen contains plenty of tissue.  However, in the event that the specimen contains only fluid but no cells, a repeat biopsy might be necessary.    Second, he/she will determine whether or not the nodule contains cancer cells.  Typically, a definitive diagnosis can be made (i.e. benign or malignant).  However, 5-10% of the time, a definitive diagnosis cannot be made; in this event, a repeat biopsy attempt or some other test or even referral to a thyroid surgeon might be necessary.  Biopsy results typically take 4-10 business days to be available.  As soon as they are available, a member of our office staff will call you to notify you of the results.  If you have not heard from our office 10 days after your biopsy, please call our office so that we can locate your results for you.    Please do not hesitate to call our office ([phone]) if you have questions or concerns.

## 2024-02-01 NOTE — PROGRESS NOTES
MERRY Crawford MD, [facility] ENDOCRINOLOGY   AND   THYROID NODULE CLINIC            Reason for visit: Tiffanie Tejeda is referred by Chencho Jackson MD for the evaluation and management of a thyroid nodule.        ASSESSMENT AND PLAN:    1. Thyroid nodule  Thank you for referring Tiffanie Tejeda to the THYROID NODULE CLINIC. I had a lengthy discussion with the patient regarding my general approach to thyroid nodules. The patient was told that, though most thyroid nodules are benign, this can only be determined with pathologic evaluation of nodule tissue. Fine needle aspiration biopsy is the preferred method of performing cytopathologic evaluation. However, FNABx occasionally results in inconclusive results which may result in a recommendation of further testing, including repeat FNABx or thyroid surgery. Additionally, FNABx does carry a small risk of false negative results. FNABx provides information about the nodule biopsied and does not provide any information about potential cancer in other parts of the thyroid. The patient is also offered consultation with a thyroid surgeon before or after FNABx if lingering concern or worry persists. After careful consideration, the patient agrees to proceed with FNABx; this was performed today. I will call the patient with today's pathology results. If negative for malignancy, I will plan to repeat thyroid ultrasound in approximately 12 months.  She is having some dysphagia particularly with bread.  She denies reflux symptoms and in fact takes a proton pump inhibitor which has not helped the swallowing problem.  I will refer her to otolaryngology.  If no other causes of dysphagia is identified, consideration of thyroid lobectomy would be reasonable.  - FINE NEEDLE ASPIRATION BX W/US GDN 1ST LESION  - [facility] - Kin Pedraza MD, Otolaryngology, [place]      Follow-up and Dispositions    Return in about 1 year (around 2/1/2025).         History of Present

## 2024-02-09 ENCOUNTER — OFFICE VISIT (OUTPATIENT)
Dept: BEHAVIORAL/MENTAL HEALTH CLINIC | Age: 34
End: 2024-02-09

## 2024-02-09 VITALS
TEMPERATURE: 99.1 F | HEART RATE: 124 BPM | OXYGEN SATURATION: 98 % | BODY MASS INDEX: 33.4 KG/M2 | DIASTOLIC BLOOD PRESSURE: 76 MMHG | WEIGHT: 171 LBS | SYSTOLIC BLOOD PRESSURE: 104 MMHG

## 2024-02-09 DIAGNOSIS — F51.04 PSYCHOPHYSIOLOGICAL INSOMNIA: ICD-10-CM

## 2024-02-09 DIAGNOSIS — F43.10 PTSD (POST-TRAUMATIC STRESS DISORDER): ICD-10-CM

## 2024-02-09 DIAGNOSIS — F41.9 ANXIETY: Primary | ICD-10-CM

## 2024-02-09 DIAGNOSIS — F51.5 NIGHTMARES ASSOCIATED WITH CHRONIC POST-TRAUMATIC STRESS DISORDER: ICD-10-CM

## 2024-02-09 DIAGNOSIS — F43.12 NIGHTMARES ASSOCIATED WITH CHRONIC POST-TRAUMATIC STRESS DISORDER: ICD-10-CM

## 2024-02-09 PROCEDURE — 99215 OFFICE O/P EST HI 40 MIN: CPT | Performed by: NURSE PRACTITIONER

## 2024-02-09 RX ORDER — AMITRIPTYLINE HYDROCHLORIDE 100 MG/1
100 TABLET ORAL NIGHTLY
Qty: 30 TABLET | Refills: 3 | Status: SHIPPED | OUTPATIENT
Start: 2024-02-09 | End: 2024-06-08

## 2024-02-09 RX ORDER — PRAZOSIN HYDROCHLORIDE 1 MG/1
1 CAPSULE ORAL NIGHTLY
Qty: 30 CAPSULE | Refills: 3 | Status: SHIPPED | OUTPATIENT
Start: 2024-02-09 | End: 2024-06-08

## 2024-02-09 RX ORDER — PRAZOSIN HYDROCHLORIDE 5 MG/1
5 CAPSULE ORAL NIGHTLY
Qty: 30 CAPSULE | Refills: 3 | Status: SHIPPED | OUTPATIENT
Start: 2024-02-09 | End: 2024-06-08

## 2024-02-09 RX ORDER — FLUOXETINE HYDROCHLORIDE 20 MG/1
20 CAPSULE ORAL EVERY MORNING
Qty: 30 CAPSULE | Refills: 3 | Status: SHIPPED | OUTPATIENT
Start: 2024-02-09 | End: 2024-06-08

## 2024-02-09 RX ORDER — FLUOXETINE HYDROCHLORIDE 40 MG/1
40 CAPSULE ORAL EVERY MORNING
Qty: 30 CAPSULE | Refills: 3 | Status: SHIPPED | OUTPATIENT
Start: 2024-02-09 | End: 2024-06-08

## 2024-02-09 ASSESSMENT — PATIENT HEALTH QUESTIONNAIRE - PHQ9
SUM OF ALL RESPONSES TO PHQ QUESTIONS 1-9: 0
SUM OF ALL RESPONSES TO PHQ9 QUESTIONS 1 & 2: 0
SUM OF ALL RESPONSES TO PHQ QUESTIONS 1-9: 0
2. FEELING DOWN, DEPRESSED OR HOPELESS: 0
SUM OF ALL RESPONSES TO PHQ QUESTIONS 1-9: 0
SUM OF ALL RESPONSES TO PHQ QUESTIONS 1-9: 0
1. LITTLE INTEREST OR PLEASURE IN DOING THINGS: 0

## 2024-02-09 NOTE — PROGRESS NOTES
OUTPATIENT PSYCHIATRIC RETURN VISIT PROGRESS NOTE    Date of Service: 2/9/2024     Identification    Tiffanie Tejeda is a 33 y.o.  with a past psychiatric history of anxiety, PTSD, nightmares, insomnia, Hx opioid dependence  who presents today for a psychiatric follow up appointment.      CC:  Routine medication management follow up.    Chief Complaint   Patient presents with    Follow-up     Pt presents today for follow up, no new concerns        Subjective / Interval History:    Pt comes to clinic today and reports that mood is stabilizing  with current medication regimen. Sleep improved with increased amitriptyline  at last visit . Nightmares decreased with increased Prazosin. Still some depressive sx and agrees to increase fluoxetine to 60 mg daily.   Enjoying her job at [employer] and soon to increase to 4 days/week.  Will also be starting classes at [employer] in the spring about which she has some anxiety.    Says she is doing well at [employer], growing  and thriving , attending all programming.   Continues at [employer] [facility] for MAT, Suboxone.   Pt has been medication compliant and denies any side-effects. Pt denies SI, HI and AVH. Does not endorse any manic or psychotic symptoms.    Psychiatric Review Of Systems:  Sleep: generally restful sleep  Appetite: ok  Current suicidal/homicidal ideations: Denies SI/ HI  Current auditory/visual hallucinations: Denies     Medications:    Current Outpatient Medications:     LORazepam (ATIVAN) 1 MG tablet, Take one tab PO one hour prior to appointment, Disp: 1 tablet, Rfl: 0    triamcinolone (KENALOG) 0.1 % cream, Apply topically 2 times daily., Disp: 30 g, Rfl: 2    naloxone 4 MG/0.1ML LIQD nasal spray, , Disp: , Rfl:     amLODIPine (NORVASC) 2.5 MG tablet, Take 1 tablet by mouth daily, Disp: 90 tablet, Rfl: 1    gabapentin (NEURONTIN) 300 MG capsule, Take 1 capsule by mouth 3 times daily for 180 days. Intended supply: 30 days, Disp: 90 capsule, Rfl: 5

## 2024-02-21 ENCOUNTER — OFFICE VISIT (OUTPATIENT)
Dept: OBGYN CLINIC | Age: 34
End: 2024-02-21

## 2024-02-21 VITALS
WEIGHT: 172 LBS | DIASTOLIC BLOOD PRESSURE: 70 MMHG | SYSTOLIC BLOOD PRESSURE: 118 MMHG | HEIGHT: 60 IN | BODY MASS INDEX: 33.77 KG/M2

## 2024-02-21 DIAGNOSIS — R87.629 ABNORMAL PAP SMEAR OF VAGINA: Primary | ICD-10-CM

## 2024-02-21 PROCEDURE — 99213 OFFICE O/P EST LOW 20 MIN: CPT | Performed by: OBSTETRICS & GYNECOLOGY

## 2024-02-21 NOTE — PROGRESS NOTES
Gyn followup note  Tiffanie Tejeda is a 33 y.o. female   who presents as a followup for abnormal pap smear.  Per patient has a history of a hysterectomy, she is a poor historian  At her last visit, STD testing done that came back normal except Hepatitis C reactive. Reports took treatment years ago. See epic notes from Dr. Jackson with details 2023.  Reports still living at [facility]. Reports feeling safe and doing well. No complaints today  Unsure pap smear history    Labs  2023 pap smear LSIL of the vagina, HPV negative    Physical Examination:  /70   Ht 1.524 m (5')   Wt 78 kg (172 lb)   BMI 33.59 kg/m²    Gen: AAOx3  Pulm: normal effort  Skin: no edema    Plan:  --plan for repeat pap smear of the vagina 2024

## 2024-03-14 ENCOUNTER — OFFICE VISIT (OUTPATIENT)
Dept: ENT CLINIC | Age: 34
End: 2024-03-14

## 2024-03-14 VITALS
BODY MASS INDEX: 32.39 KG/M2 | DIASTOLIC BLOOD PRESSURE: 78 MMHG | SYSTOLIC BLOOD PRESSURE: 104 MMHG | WEIGHT: 165 LBS | HEIGHT: 60 IN

## 2024-03-14 DIAGNOSIS — K21.9 LARYNGOPHARYNGEAL REFLUX (LPR): ICD-10-CM

## 2024-03-14 DIAGNOSIS — E04.1 THYROID NODULE: Primary | ICD-10-CM

## 2024-03-14 DIAGNOSIS — R13.14 PHARYNGOESOPHAGEAL DYSPHAGIA: ICD-10-CM

## 2024-03-14 PROCEDURE — 31575 DIAGNOSTIC LARYNGOSCOPY: CPT | Performed by: STUDENT IN AN ORGANIZED HEALTH CARE EDUCATION/TRAINING PROGRAM

## 2024-03-14 PROCEDURE — 99204 OFFICE O/P NEW MOD 45 MIN: CPT | Performed by: STUDENT IN AN ORGANIZED HEALTH CARE EDUCATION/TRAINING PROGRAM

## 2024-03-14 RX ORDER — FAMOTIDINE 40 MG/1
40 TABLET, FILM COATED ORAL EVERY EVENING
Qty: 90 TABLET | Refills: 3 | Status: SHIPPED | OUTPATIENT
Start: 2024-03-14

## 2024-03-14 ASSESSMENT — ENCOUNTER SYMPTOMS
EYE ITCHING: 0
EYE REDNESS: 0
VOICE CHANGE: 0
VOMITING: 0
SHORTNESS OF BREATH: 0
TROUBLE SWALLOWING: 1
SORE THROAT: 0

## 2024-03-14 NOTE — PROGRESS NOTES
[facility] Office Note    Patient: Tiffanie Tejeda  MRN: [id number]  : 1990  Gender:  female  Vital Signs: /78 (Site: Left Upper Arm, Position: Sitting)   Ht 1.524 m (5')   Wt 74.8 kg (165 lb)   BMI 32.22 kg/m²   Date: 3/14/2024    CC:   Chief Complaint   Patient presents with    New Patient     Patient presents today for a thyroid nodule. She experience the feeling like there is a lump in her throat every time she swallows.         HPI:  Tiffanie Tejeda is a 33 y.o. female with a thyroid nodule and dysphagia.  Notes from referring provider reviewed.  She is followed by Dr. Crawford with endocrinology.  She has a 2 cm left thyroid nodule that has been biopsied with pathology returning as benign.  She does endorse globus sensation and dysphagia.  She feels like she has a lump in her throat.  She takes Prilosec in the morning.  She in particular has trouble swallowing dry foods like bread.  She for started noticing this in September once moving to [place].  She denies allergies or postnasal drainage.    Past Medical History, Past Surgical History, Family history, Social History, and Medications were all reviewed with the patient today and updated as necessary.     No Known Allergies  Patient Active Problem List   Diagnosis    PTSD (post-traumatic stress disorder)    Bilateral hand pain    Anxiety    Psychophysiological insomnia    Nightmares associated with chronic post-traumatic stress disorder    Thyroid nodule     Current Outpatient Medications   Medication Sig    famotidine (PEPCID) 40 MG tablet Take 1 tablet by mouth every evening    FLUoxetine (PROZAC) 40 MG capsule Take 1 capsule by mouth every morning    FLUoxetine (PROZAC) 20 MG capsule Take 1 capsule by mouth every morning Take with the 40 mg capsule to equal 60 mg daily.    amitriptyline (ELAVIL) 100 MG tablet Take 1 tablet by mouth nightly    prazosin (MINIPRESS) 1 MG capsule Take 1 capsule by mouth nightly Take with the 5 mg

## 2024-03-21 ENCOUNTER — OFFICE VISIT (OUTPATIENT)
Dept: RHEUMATOLOGY | Age: 34
End: 2024-03-21

## 2024-03-21 VITALS
SYSTOLIC BLOOD PRESSURE: 110 MMHG | DIASTOLIC BLOOD PRESSURE: 82 MMHG | BODY MASS INDEX: 32.98 KG/M2 | HEART RATE: 110 BPM | HEIGHT: 60 IN | WEIGHT: 168 LBS

## 2024-03-21 DIAGNOSIS — M32.9 LUPUS (HCC): Primary | ICD-10-CM

## 2024-03-21 DIAGNOSIS — Z71.89 ENCOUNTER FOR MEDICATION REVIEW AND COUNSELING: ICD-10-CM

## 2024-03-21 DIAGNOSIS — I73.00 RAYNAUD'S DISEASE WITHOUT GANGRENE: ICD-10-CM

## 2024-03-21 DIAGNOSIS — Z01.89 ENCOUNTER FOR LABORATORY TEST: ICD-10-CM

## 2024-03-21 DIAGNOSIS — M32.9 LUPUS (HCC): ICD-10-CM

## 2024-03-21 PROBLEM — F19.11 DRUG ABUSE IN REMISSION (HCC): Status: ACTIVE | Noted: 2023-08-02

## 2024-03-21 LAB
APPEARANCE UR: ABNORMAL
BACTERIA URNS QL MICRO: ABNORMAL /HPF
BILIRUB UR QL: ABNORMAL
CASTS URNS QL MICRO: 0 /LPF
COLOR UR: ABNORMAL
CRYSTALS URNS QL MICRO: 0 /LPF
EPI CELLS #/AREA URNS HPF: ABNORMAL /HPF
GLUCOSE UR STRIP.AUTO-MCNC: NEGATIVE MG/DL
HGB UR QL STRIP: NEGATIVE
KETONES UR QL STRIP.AUTO: ABNORMAL MG/DL
LEUKOCYTE ESTERASE UR QL STRIP.AUTO: ABNORMAL
MUCOUS THREADS URNS QL MICRO: 0 /LPF
NITRITE UR QL STRIP.AUTO: NEGATIVE
OTHER OBSERVATIONS: ABNORMAL
PH UR STRIP: 6 (ref 5–9)
PROT UR STRIP-MCNC: ABNORMAL MG/DL
RBC #/AREA URNS HPF: 0 /HPF
SP GR UR REFRACTOMETRY: 1.03 (ref 1–1.02)
URINE CULTURE IF INDICATED: ABNORMAL
UROBILINOGEN UR QL STRIP.AUTO: 1 EU/DL (ref 0.2–1)
WBC URNS QL MICRO: ABNORMAL /HPF

## 2024-03-21 PROCEDURE — 99215 OFFICE O/P EST HI 40 MIN: CPT | Performed by: INTERNAL MEDICINE

## 2024-03-21 RX ORDER — HYDROXYCHLOROQUINE SULFATE 200 MG/1
TABLET, FILM COATED ORAL
Qty: 60 TABLET | Refills: 3 | Status: SHIPPED | OUTPATIENT
Start: 2024-03-21

## 2024-03-21 RX ORDER — IBUPROFEN 200 MG
200 TABLET ORAL EVERY 6 HOURS PRN
COMMUNITY

## 2024-03-21 ASSESSMENT — JOINT PAIN
TOTAL NUMBER OF TENDER JOINTS: 22
TOTAL NUMBER OF SWOLLEN JOINTS: 10

## 2024-03-21 NOTE — PROGRESS NOTES
[facility]  Lisa Calhoun M.D.  [address] , [address]   [facility]  Office : [phone], Fax: [phone]     RHEUMATOLOGY OFFICE VISIT NOTE  Date of Visit:  3/21/2024 2:17 PM    Patient Information:  Name:  Tiffanie Tejeda  :  1990  Age:  34 y.o.   Gender:  female      Ms. Tejeda is here today for joint pain, initiation of Plaquenil, Raynaud's and  discuss lab results from the last visit.      Last visit: 24    History of Present Illness: On talking to the patient today she states that she has continued to have ongoing joint pain as mentioned below.  She denies having any seasonal allergy related issues such as a cough, congestion, fever or chills currently.  She states that she did read the information on hydroxychloroquine and all of the questions and concerns regarding the drug were discussed with the patient today.    Since the last visit, patient is feeling \"fair\".    Pain: 6/10  Location:  Some pain and swelling of both the hands worse in the left than the right.  Bilateral thumb stiffness. Some left shoulder pain worse than the right shoulder pain. Some neck stiffness with some pain with neck ROM. Some para spinal muscle pain. Bilateral knee pain with swelling with occasional buckling with no warmth and redness. Some pain and swelling of the ankles and feet.   Quality:  Deep achy pain.   Modifying Factors:  1st thing in the morning the stiffness is the worst and movement helps ease the pain.   Associated Symptoms:  Intermittent tingling and numbness from the shoulders to the fingertips. No tingling, numbness or pain down the legs but constant tingling and numbness of her feet. Poor  with difficulty opening jars and buttoning and unbuttoning with difficulty holding on to a knife.     Last TB screen: Unsure  TB result:NA    Current dose of steroids:none  How long on current dose of steroids:NA  How long on continuous steroid

## 2024-03-23 LAB — DSDNA AB SER-ACNC: 14 IU/ML (ref 0–9)

## 2024-03-27 ENCOUNTER — APPOINTMENT (OUTPATIENT)
Dept: GENERAL RADIOLOGY | Age: 34
End: 2024-03-27

## 2024-03-27 ENCOUNTER — HOSPITAL ENCOUNTER (EMERGENCY)
Age: 34
Discharge: HOME | End: 2024-03-27

## 2024-03-27 VITALS
DIASTOLIC BLOOD PRESSURE: 63 MMHG | BODY MASS INDEX: 32.98 KG/M2 | HEIGHT: 60 IN | OXYGEN SATURATION: 98 % | SYSTOLIC BLOOD PRESSURE: 91 MMHG | WEIGHT: 168 LBS | HEART RATE: 83 BPM | RESPIRATION RATE: 19 BRPM | TEMPERATURE: 98.4 F

## 2024-03-27 DIAGNOSIS — F51.04 PSYCHOPHYSIOLOGICAL INSOMNIA: ICD-10-CM

## 2024-03-27 DIAGNOSIS — F32.A DEPRESSION, UNSPECIFIED DEPRESSION TYPE: ICD-10-CM

## 2024-03-27 DIAGNOSIS — R07.9 CHEST PAIN, UNSPECIFIED TYPE: ICD-10-CM

## 2024-03-27 DIAGNOSIS — F41.1 ANXIETY STATE: Primary | ICD-10-CM

## 2024-03-27 LAB
ANION GAP SERPL CALC-SCNC: 5 MMOL/L (ref 2–11)
BASOPHILS # BLD: 0 K/UL (ref 0–0.2)
BASOPHILS NFR BLD: 0 % (ref 0–2)
BUN SERPL-MCNC: 13 MG/DL (ref 6–23)
CALCIUM SERPL-MCNC: 9.3 MG/DL (ref 8.3–10.4)
CHLORIDE SERPL-SCNC: 106 MMOL/L (ref 103–113)
CO2 SERPL-SCNC: 26 MMOL/L (ref 21–32)
CREAT SERPL-MCNC: 0.8 MG/DL (ref 0.6–1)
DIFFERENTIAL METHOD BLD: NORMAL
EKG ATRIAL RATE: 115 BPM
EKG DIAGNOSIS: NORMAL
EKG P AXIS: 63 DEGREES
EKG P-R INTERVAL: 138 MS
EKG Q-T INTERVAL: 322 MS
EKG QRS DURATION: 92 MS
EKG QTC CALCULATION (BAZETT): 445 MS
EKG R AXIS: 15 DEGREES
EKG T AXIS: 50 DEGREES
EKG VENTRICULAR RATE: 115 BPM
EOSINOPHIL # BLD: 0.2 K/UL (ref 0–0.8)
EOSINOPHIL NFR BLD: 2 % (ref 0.5–7.8)
ERYTHROCYTE [DISTWIDTH] IN BLOOD BY AUTOMATED COUNT: 12.7 % (ref 11.9–14.6)
GLUCOSE SERPL-MCNC: 104 MG/DL (ref 65–100)
HCT VFR BLD AUTO: 38.1 % (ref 35.8–46.3)
HGB BLD-MCNC: 12.8 G/DL (ref 11.7–15.4)
IMM GRANULOCYTES # BLD AUTO: 0 K/UL (ref 0–0.5)
IMM GRANULOCYTES NFR BLD AUTO: 0 % (ref 0–5)
LYMPHOCYTES # BLD: 1.1 K/UL (ref 0.5–4.6)
LYMPHOCYTES NFR BLD: 16 % (ref 13–44)
MCH RBC QN AUTO: 28.6 PG (ref 26.1–32.9)
MCHC RBC AUTO-ENTMCNC: 33.6 G/DL (ref 31.4–35)
MCV RBC AUTO: 85.2 FL (ref 82–102)
MONOCYTES # BLD: 0.3 K/UL (ref 0.1–1.3)
MONOCYTES NFR BLD: 4 % (ref 4–12)
NEUTS SEG # BLD: 5.4 K/UL (ref 1.7–8.2)
NEUTS SEG NFR BLD: 78 % (ref 43–78)
NRBC # BLD: 0 K/UL (ref 0–0.2)
PLATELET # BLD AUTO: 202 K/UL (ref 150–450)
PMV BLD AUTO: 9.6 FL (ref 9.4–12.3)
POTASSIUM SERPL-SCNC: 3.8 MMOL/L (ref 3.5–5.1)
RBC # BLD AUTO: 4.47 M/UL (ref 4.05–5.2)
SODIUM SERPL-SCNC: 137 MMOL/L (ref 136–146)
TROPONIN I SERPL HS-MCNC: 3.1 PG/ML (ref 0–14)
TROPONIN I SERPL HS-MCNC: <3 PG/ML (ref 0–14)
WBC # BLD AUTO: 7 K/UL (ref 4.3–11.1)

## 2024-03-27 PROCEDURE — 94761 N-INVAS EAR/PLS OXIMETRY MLT: CPT

## 2024-03-27 PROCEDURE — 85025 COMPLETE CBC W/AUTO DIFF WBC: CPT

## 2024-03-27 PROCEDURE — 93005 ELECTROCARDIOGRAM TRACING: CPT | Performed by: EMERGENCY MEDICINE

## 2024-03-27 PROCEDURE — 71046 X-RAY EXAM CHEST 2 VIEWS: CPT

## 2024-03-27 PROCEDURE — 96361 HYDRATE IV INFUSION ADD-ON: CPT

## 2024-03-27 PROCEDURE — 6360000002 HC RX W HCPCS

## 2024-03-27 PROCEDURE — 96374 THER/PROPH/DIAG INJ IV PUSH: CPT

## 2024-03-27 PROCEDURE — 2580000003 HC RX 258

## 2024-03-27 PROCEDURE — A4216 STERILE WATER/SALINE, 10 ML: HCPCS

## 2024-03-27 PROCEDURE — 80048 BASIC METABOLIC PNL TOTAL CA: CPT

## 2024-03-27 PROCEDURE — 93010 ELECTROCARDIOGRAM REPORT: CPT | Performed by: INTERNAL MEDICINE

## 2024-03-27 PROCEDURE — 84484 ASSAY OF TROPONIN QUANT: CPT

## 2024-03-27 PROCEDURE — 99285 EMERGENCY DEPT VISIT HI MDM: CPT

## 2024-03-27 RX ORDER — AMITRIPTYLINE HYDROCHLORIDE 150 MG/1
150 TABLET ORAL NIGHTLY
Qty: 30 TABLET | Refills: 0 | Status: SHIPPED | OUTPATIENT
Start: 2024-03-27 | End: 2024-04-26

## 2024-03-27 RX ORDER — 0.9 % SODIUM CHLORIDE 0.9 %
500 INTRAVENOUS SOLUTION INTRAVENOUS
Status: COMPLETED | OUTPATIENT
Start: 2024-03-27 | End: 2024-03-27

## 2024-03-27 RX ADMIN — SODIUM CHLORIDE 1 MG: 9 INJECTION INTRAMUSCULAR; INTRAVENOUS; SUBCUTANEOUS at 13:04

## 2024-03-27 RX ADMIN — SODIUM CHLORIDE 500 ML: 9 INJECTION, SOLUTION INTRAVENOUS at 13:04

## 2024-03-27 ASSESSMENT — ENCOUNTER SYMPTOMS
CHEST TIGHTNESS: 1
SHORTNESS OF BREATH: 1
NAUSEA: 0
ABDOMINAL PAIN: 0
CHOKING: 0
COUGH: 0
DIARRHEA: 0
CONSTIPATION: 0
VOMITING: 0
WHEEZING: 0

## 2024-03-27 ASSESSMENT — LIFESTYLE VARIABLES
HOW MANY STANDARD DRINKS CONTAINING ALCOHOL DO YOU HAVE ON A TYPICAL DAY: PATIENT DOES NOT DRINK
HOW OFTEN DO YOU HAVE A DRINK CONTAINING ALCOHOL: NEVER

## 2024-03-27 NOTE — CONSULTS
PSYCHIATRIC EVALUATION    Date of Service: 3/27/2024    Purpose:  Psychiatric Diagnostic Evaluation  Referral Source: Molly Watkins PA-C   History  From: patient and patient chart      Chief Complaint:  suicidal thoughts, no plan, hearing voices       3- - PMHNP note -         History of Present Illness:  Tiffanie Tejeda is a 34 y.o. female with a history significant for     Pt presented to the ED today, c/o:    Triage note - Pt ambulatory to triage c/o C.P, SOB, lightheadedness and dizziness. Pt reports HX of anxiety and lupus and recently started on a new medication.     PA note - Patient is a 34-year-old female with past medical history of drug abuse currently in remission, and PTSD with anxiety/depression presenting with chest tightness and shortness of breath.  Patient was at her counselor's office earlier today and she started to feel chest tightness, shortness of breath, and like she was \"out of her body\".  Patient states she has had the symptoms before when she was having a panic attack and feels like this is similar.  She does have a lot of past trauma but is currently in a case management program, is closely followed by psychiatry, and counseling.  She is on Prozac daily.  She denies any suicidal thoughts or plan of suicide.  Denies any homicidal thoughts or ideations.  She denies any pleuritic pain, lower extremity swelling, long recent travels, periods of immobilization, recent surgeries, or any history of DVT or PE.  Denies any previous cardiac history, onset of chest pain with exertion, nausea, diaphoresis, arm pain, jaw pain, or any other anginal equivalents.  Denies any chest pain radiating into her back or upper extremity paresthesias.  Patient is slightly tearful in the exam room and anxious appearing.  Patient is accompanied with her case management worker.     Patient is a 34-year-old female with past medical history of drug abuse currently in remission, and PTSD with

## 2024-03-27 NOTE — DISCHARGE INSTRUCTIONS
Please begin taking the amitriptyline 150 mg nightly to help you with sleep.  Please follow-up with your psychiatrist as well as your therapist.  Please return to the ED immediately if you begin to experience any suicidal thoughts.

## 2024-03-27 NOTE — ACP (ADVANCE CARE PLANNING)
Advance Care Planning   Healthcare Decision Maker:    Primary Decision Maker: [facility] - Other - [phone]    Primary Decision Maker: Mecca Moseley -  - [phone]    Click here to complete Healthcare Decision Makers including selection of the Healthcare Decision Maker Relationship (ie \"Primary\").

## 2024-03-27 NOTE — CARE COORDINATION
Prior To Admission Other (Comment)   Current DME Prior to Arrival Other (Comment)   Potential Assistance Needed Other (Comment)   DME Ordered? No   Potential Assistance Purchasing Medications No   Type of Home Care Services None   Patient expects to be discharged to: House   History of falls? 0   Services At/After Discharge   Transition of Care Consult (CM Consult) Discharge Planning   Services At/After Discharge None   [facility] Resource Information Provided? No   Confirm Follow Up Transport Other (see comment)  ([facility])   Condition of Participation: Discharge Planning   The Plan for Transition of Care is related to the following treatment goals: based on clinical course   The Patient and/or Patient Representative was provided with a Choice of Provider? Patient   The Patient and/Or Patient Representative agree with the Discharge Plan? Yes   Freedom of Choice list was provided with basic dialogue that supports the patient's individualized plan of care/goals, treatment preferences, and shares the quality data associated with the providers?  Yes

## 2024-03-27 NOTE — ED NOTES
Patient mobility status  with no difficulty. Provider aware     I have reviewed discharge instructions with the patient.  The patient verbalized understanding.    Patient left ED via Discharge Method: ambulatory to Home with Friend.    Opportunity for questions and clarification provided.     Patient given 1 scripts.            Bindu Guardado, RN  03/27/24 7386

## 2024-03-27 NOTE — ED PROVIDER NOTES
nightly Take with the 5 mg capsule to equal 6 mg nightly, Disp-30 capsule, R-3Normal      !! prazosin (MINIPRESS) 5 MG capsule Take 1 capsule by mouth nightly, Disp-30 capsule, R-3Normal      triamcinolone (KENALOG) 0.1 % cream Apply topically 2 times daily., Disp-30 g, R-2, Normal      amLODIPine (NORVASC) 2.5 MG tablet Take 1 tablet by mouth daily, Disp-90 tablet, R-1Normal      gabapentin (NEURONTIN) 300 MG capsule Take 1 capsule by mouth 3 times daily for 180 days. Intended supply: 30 days, Disp-90 capsule, R-5Normal      omeprazole (PRILOSEC) 20 MG delayed release capsule Take bid before meals prn acid reflux, Disp-60 capsule, R-3Normal      buprenorphine-naloxone (SUBOXONE) 8-2 MG FILM SL film Place 1 Film under the tongue in the morning and at bedtime.Historical Med       !! - Potential duplicate medications found. Please discuss with provider.           Results for orders placed or performed during the hospital encounter of 03/27/24   XR CHEST (2 VW)    Narrative    Chest X-ray    INDICATION: Chest pain    COMPARISON:  None    TECHNIQUE: PA and lateral views of the chest were obtained.    FINDINGS: The lungs are clear. There are no infiltrates or effusions.  The heart  size is normal.  The bony thorax is intact.        Impression    No acute findings in the chest     Basic Metabolic Panel   Result Value Ref Range    Sodium 137 136 - 146 mmol/L    Potassium 3.8 3.5 - 5.1 mmol/L    Chloride 106 103 - 113 mmol/L    CO2 26 21 - 32 mmol/L    Anion Gap 5 2 - 11 mmol/L    Glucose 104 (H) 65 - 100 mg/dL    BUN 13 6 - 23 MG/DL    Creatinine 0.80 0.6 - 1.0 MG/DL    Est, Glom Filt Rate >90 >60 ml/min/1.73m2    Calcium 9.3 8.3 - 10.4 MG/DL   CBC with Auto Differential   Result Value Ref Range    WBC 7.0 4.3 - 11.1 K/uL    RBC 4.47 4.05 - 5.2 M/uL    Hemoglobin 12.8 11.7 - 15.4 g/dL    Hematocrit 38.1 35.8 - 46.3 %    MCV 85.2 82 - 102 FL    MCH 28.6 26.1 - 32.9 PG    MCHC 33.6 31.4 - 35.0 g/dL    RDW 12.7 11.9 - 14.6 %  [FreeTextEntry2] : follow up

## 2024-03-27 NOTE — ED NOTES
Patient belongings bag secured behind door in room 12. Pt updated on plan of care in regards to psych evaluating patient.      Bindu Guardado, RN  03/27/24 7338

## 2024-03-27 NOTE — ED NOTES
Constant Observer Nafisa   Constant Observer Oriented yes   High risk patients are in line of sight at all times Yes   Excess equipment/medical supplies not necessary for the care of the patient removed Yes   All sharp or dangerous objects are removed from room: including but not limited to belts, pens & pencils, needles, medications, cosmetics, lighters, matches, nail files, watches, necklaces, glass objects, razors, razor blades, knives, aerosol sprays, drawstring pants, shoes, cords (telephone, call bells, etc.) cleaning wipes or other cleaning items, aluminum cans, not permanently attached wall décor Yes   Telephone/cell phone removed as well as TV remote (batteries can be swallowed) Yes   Patient belongings removed and labeled at nurses station Yes   Excess linen is removed from room Yes   All plastic bags are removed from the room and replaced with paper trash bags Yes   Patient is in paper scrubs or appropriate gown and using hospital socks with rubber soles Yes   No metal, hard eating utensils or hard plates are on meal tray Yes   Remove all cleaning agents used by Environmental Services Yes   If Crucifix is hanging on a nail, remove Crucifix as well as the nail Yes       *If any question above is answered \"No,\" documentation is required.       Lo Antonio, RN  03/27/24 7129

## 2024-03-27 NOTE — ED TRIAGE NOTES
Pt ambulatory to triage c/o C.P, SOB, lightheadedness and dizziness. Pt reports HX of anxiety and lupus and recently started on a new medication.

## 2024-04-02 ENCOUNTER — TELEPHONE (OUTPATIENT)
Dept: FAMILY MEDICINE CLINIC | Facility: CLINIC | Age: 34
End: 2024-04-02

## 2024-04-02 ENCOUNTER — OFFICE VISIT (OUTPATIENT)
Dept: FAMILY MEDICINE CLINIC | Facility: CLINIC | Age: 34
End: 2024-04-02

## 2024-04-02 VITALS
BODY MASS INDEX: 32.94 KG/M2 | OXYGEN SATURATION: 98 % | WEIGHT: 167.8 LBS | SYSTOLIC BLOOD PRESSURE: 122 MMHG | HEART RATE: 116 BPM | HEIGHT: 60 IN | DIASTOLIC BLOOD PRESSURE: 70 MMHG

## 2024-04-02 DIAGNOSIS — M32.9 LUPUS (HCC): Primary | ICD-10-CM

## 2024-04-02 DIAGNOSIS — M32.9 LUPUS (HCC): ICD-10-CM

## 2024-04-02 LAB
ALBUMIN SERPL-MCNC: 4 G/DL (ref 3.5–5)
ALBUMIN/GLOB SERPL: 1.2 (ref 0.4–1.6)
ALP SERPL-CCNC: 111 U/L (ref 50–136)
ALT SERPL-CCNC: 22 U/L (ref 12–65)
ANION GAP SERPL CALC-SCNC: 6 MMOL/L (ref 2–11)
AST SERPL-CCNC: 22 U/L (ref 15–37)
BILIRUB SERPL-MCNC: 0.5 MG/DL (ref 0.2–1.1)
BILIRUBIN, URINE, POC: NEGATIVE
BLOOD URINE, POC: NEGATIVE
BUN SERPL-MCNC: 14 MG/DL (ref 6–23)
CALCIUM SERPL-MCNC: 9.2 MG/DL (ref 8.3–10.4)
CHLORIDE SERPL-SCNC: 104 MMOL/L (ref 103–113)
CO2 SERPL-SCNC: 28 MMOL/L (ref 21–32)
CREAT SERPL-MCNC: 0.8 MG/DL (ref 0.6–1)
CRP SERPL-MCNC: 13.6 MG/DL (ref 0–0.9)
ERYTHROCYTE [SEDIMENTATION RATE] IN BLOOD: 8 MM/HR (ref 0–20)
GLOBULIN SER CALC-MCNC: 3.4 G/DL (ref 2.8–4.5)
GLUCOSE SERPL-MCNC: 85 MG/DL (ref 65–100)
GLUCOSE URINE, POC: NEGATIVE
KETONES, URINE, POC: NEGATIVE
LEUKOCYTE ESTERASE, URINE, POC: NEGATIVE
NITRITE, URINE, POC: NEGATIVE
PH, URINE, POC: 6 (ref 4.6–8)
POTASSIUM SERPL-SCNC: 4.3 MMOL/L (ref 3.5–5.1)
PROT SERPL-MCNC: 7.4 G/DL (ref 6.3–8.2)
PROTEIN,URINE, POC: NORMAL
SODIUM SERPL-SCNC: 138 MMOL/L (ref 136–146)
SPECIFIC GRAVITY, URINE, POC: >1.03 (ref 1–1.03)
URINALYSIS CLARITY, POC: CLEAR
URINALYSIS COLOR, POC: NORMAL
UROBILINOGEN, POC: NORMAL

## 2024-04-02 PROCEDURE — 99214 OFFICE O/P EST MOD 30 MIN: CPT | Performed by: FAMILY MEDICINE

## 2024-04-02 PROCEDURE — 81003 URINALYSIS AUTO W/O SCOPE: CPT | Performed by: FAMILY MEDICINE

## 2024-04-02 RX ORDER — MELOXICAM 15 MG/1
15 TABLET ORAL DAILY
Qty: 30 TABLET | Refills: 5 | Status: SHIPPED | OUTPATIENT
Start: 2024-04-02

## 2024-04-02 ASSESSMENT — PATIENT HEALTH QUESTIONNAIRE - PHQ9
SUM OF ALL RESPONSES TO PHQ QUESTIONS 1-9: 1
SUM OF ALL RESPONSES TO PHQ9 QUESTIONS 1 & 2: 1
2. FEELING DOWN, DEPRESSED OR HOPELESS: SEVERAL DAYS
SUM OF ALL RESPONSES TO PHQ QUESTIONS 1-9: 1

## 2024-04-02 NOTE — PROGRESS NOTES
wall.  She was able to reduce. CT scan done in ER showed no hernia.  Did show constipation.  GI recommended probiotic and fiber supplement.   -hematemesis: resolved with prilosec.  -hepatitis: patient reports today that previously told she had hep C and hep B.  She took treatment for hep C for a few weeks.  It made her feel sick and discontinued.  She was told that medication probably caused worsening of hep B.  Last hcv viral pcr was not detectable. She was found not to have hepatitis B      Acute concern today regarding rash on lower legs, increased morning stiffness and joint pain.  She is now established with rheumatology and started on plaquenil.  She reports increase in pain two weeks ago.  Rested for 3 days and pain improved.  She then developed increase in pain several days ago.  Reports feels extreme stiffness in the morning.  Always wakes up early to do yoga to help with stiffness.  Does not feel helping the past few days.  Having issues at work with dropping objects secondary to pain in hands.     Also seen in ER for anxiety.  Amitriptyline dose was increased.      Specialists:  Lucero Kwok, Psychiatry  Dr Calhoun, Rheumatology  Dr. Ritchie, Gynecology      Pap: history of abnormal.  Had prior biopsy.  S/p hysterectomy  STDs: none             Review of Systems   Constitutional:  Positive for fatigue.   Musculoskeletal:  Positive for arthralgias and myalgias.        Stiffness   Psychiatric/Behavioral:  Positive for sleep disturbance. The patient is nervous/anxious.         /70   Pulse (!) 116   Ht 1.524 m (5')   Wt 76.1 kg (167 lb 12.8 oz)   SpO2 98%   BMI 32.77 kg/m²     Wt Readings from Last 3 Encounters:   04/02/24 76.1 kg (167 lb 12.8 oz)   03/27/24 76.2 kg (168 lb)   03/21/24 76.2 kg (168 lb)          Physical Exam  Vitals reviewed.   Constitutional:       Appearance: Normal appearance.   Cardiovascular:      Rate and Rhythm: Normal rate and regular rhythm.   Pulmonary:      Effort:

## 2024-04-03 PROBLEM — F32.A ANXIETY AND DEPRESSION: Status: ACTIVE | Noted: 2024-04-03

## 2024-04-03 PROBLEM — F41.9 ANXIETY AND DEPRESSION: Status: ACTIVE | Noted: 2024-04-03

## 2024-04-09 NOTE — RESULT ENCOUNTER NOTE
Crp is elevated at 13.6.  Meloxicam is helping but would like to try low dose of prednisone to see if helps further with pain.      Discussed psychiatric medications with psychiatrist.  Agreed to lower dose of amitriptyline back to 100 mg daily and to increase gabapentin to 600 mg at bed time

## 2024-04-15 ENCOUNTER — OFFICE VISIT (OUTPATIENT)
Dept: FAMILY MEDICINE CLINIC | Facility: CLINIC | Age: 34
End: 2024-04-15

## 2024-04-15 VITALS
TEMPERATURE: 98.5 F | OXYGEN SATURATION: 98 % | HEART RATE: 120 BPM | WEIGHT: 171 LBS | SYSTOLIC BLOOD PRESSURE: 122 MMHG | BODY MASS INDEX: 33.57 KG/M2 | HEIGHT: 60 IN | DIASTOLIC BLOOD PRESSURE: 68 MMHG

## 2024-04-15 DIAGNOSIS — R10.2 PELVIC PAIN: ICD-10-CM

## 2024-04-15 DIAGNOSIS — R31.0 GROSS HEMATURIA: Primary | ICD-10-CM

## 2024-04-15 LAB
BILIRUBIN, URINE, POC: NEGATIVE
BLOOD URINE, POC: NEGATIVE
GLUCOSE URINE, POC: NEGATIVE
KETONES, URINE, POC: NEGATIVE
LEUKOCYTE ESTERASE, URINE, POC: NEGATIVE
NITRITE, URINE, POC: NEGATIVE
PH, URINE, POC: 6.5 (ref 4.6–8)
PROTEIN,URINE, POC: NEGATIVE
SPECIFIC GRAVITY, URINE, POC: >1.03 (ref 1–1.03)
URINALYSIS CLARITY, POC: CLEAR
URINALYSIS COLOR, POC: YELLOW
UROBILINOGEN, POC: NORMAL

## 2024-04-15 PROCEDURE — 81003 URINALYSIS AUTO W/O SCOPE: CPT | Performed by: FAMILY MEDICINE

## 2024-04-15 PROCEDURE — 99214 OFFICE O/P EST MOD 30 MIN: CPT | Performed by: FAMILY MEDICINE

## 2024-04-15 ASSESSMENT — ENCOUNTER SYMPTOMS
ANAL BLEEDING: 0
BLOOD IN STOOL: 0
ABDOMINAL PAIN: 1

## 2024-04-15 ASSESSMENT — PATIENT HEALTH QUESTIONNAIRE - PHQ9
SUM OF ALL RESPONSES TO PHQ QUESTIONS 1-9: 10
1. LITTLE INTEREST OR PLEASURE IN DOING THINGS: NOT AT ALL
SUM OF ALL RESPONSES TO PHQ QUESTIONS 1-9: 10
SUM OF ALL RESPONSES TO PHQ QUESTIONS 1-9: 10
8. MOVING OR SPEAKING SO SLOWLY THAT OTHER PEOPLE COULD HAVE NOTICED. OR THE OPPOSITE, BEING SO FIGETY OR RESTLESS THAT YOU HAVE BEEN MOVING AROUND A LOT MORE THAN USUAL: NOT AT ALL
10. IF YOU CHECKED OFF ANY PROBLEMS, HOW DIFFICULT HAVE THESE PROBLEMS MADE IT FOR YOU TO DO YOUR WORK, TAKE CARE OF THINGS AT HOME, OR GET ALONG WITH OTHER PEOPLE: SOMEWHAT DIFFICULT
5. POOR APPETITE OR OVEREATING: NOT AT ALL
6. FEELING BAD ABOUT YOURSELF - OR THAT YOU ARE A FAILURE OR HAVE LET YOURSELF OR YOUR FAMILY DOWN: NOT AT ALL
SUM OF ALL RESPONSES TO PHQ9 QUESTIONS 1 & 2: 1
SUM OF ALL RESPONSES TO PHQ QUESTIONS 1-9: 10
3. TROUBLE FALLING OR STAYING ASLEEP: NEARLY EVERY DAY
4. FEELING TIRED OR HAVING LITTLE ENERGY: NEARLY EVERY DAY
2. FEELING DOWN, DEPRESSED OR HOPELESS: SEVERAL DAYS
7. TROUBLE CONCENTRATING ON THINGS, SUCH AS READING THE NEWSPAPER OR WATCHING TELEVISION: NEARLY EVERY DAY
9. THOUGHTS THAT YOU WOULD BE BETTER OFF DEAD, OR OF HURTING YOURSELF: NOT AT ALL

## 2024-04-15 NOTE — PROGRESS NOTES
Tiffanie Tejeda is a 34 y.o. female who presents today for the following:  Chief Complaint   Patient presents with    Hematuria     Reports blood in urine x4 days; pain in pelvic area       No Known Allergies    Current Outpatient Medications   Medication Sig Dispense Refill    predniSONE (DELTASONE) 10 MG tablet Take 1 tablet by mouth daily for 7 days 7 tablet 0    amitriptyline (ELAVIL) 100 MG tablet Take 1 tablet by mouth nightly 30 tablet 1    gabapentin (NEURONTIN) 300 MG capsule Take one tab PO in AM, one tab PO in afternoon and two tabs PO at bedtime 360 capsule 5    meloxicam (MOBIC) 15 MG tablet Take 1 tablet by mouth daily 30 tablet 5    ibuprofen (ADVIL;MOTRIN) 200 MG tablet Take 1 tablet by mouth every 6 hours as needed for Pain      hydroxychloroquine (PLAQUENIL) 200 MG tablet Take 1 pill twice a day after food. 60 tablet 3    famotidine (PEPCID) 40 MG tablet Take 1 tablet by mouth every evening 90 tablet 3    FLUoxetine (PROZAC) 40 MG capsule Take 1 capsule by mouth every morning 30 capsule 3    FLUoxetine (PROZAC) 20 MG capsule Take 1 capsule by mouth every morning Take with the 40 mg capsule to equal 60 mg daily. 30 capsule 3    prazosin (MINIPRESS) 1 MG capsule Take 1 capsule by mouth nightly Take with the 5 mg capsule to equal 6 mg nightly 30 capsule 3    prazosin (MINIPRESS) 5 MG capsule Take 1 capsule by mouth nightly 30 capsule 3    triamcinolone (KENALOG) 0.1 % cream Apply topically 2 times daily. 30 g 2    amLODIPine (NORVASC) 2.5 MG tablet Take 1 tablet by mouth daily 90 tablet 1    omeprazole (PRILOSEC) 20 MG delayed release capsule Take bid before meals prn acid reflux 60 capsule 3    buprenorphine-naloxone (SUBOXONE) 8-2 MG FILM SL film Place 1 Film under the tongue in the morning and at bedtime.       No current facility-administered medications for this visit.       Past Medical History:   Diagnosis Date    Abnormal Pap smear of cervix     Anxiety and depression     Asthma     Drug

## 2024-04-22 ENCOUNTER — TELEPHONE (OUTPATIENT)
Dept: FAMILY MEDICINE CLINIC | Facility: CLINIC | Age: 34
End: 2024-04-22

## 2024-04-22 ENCOUNTER — TELEPHONE (OUTPATIENT)
Dept: BEHAVIORAL/MENTAL HEALTH CLINIC | Age: 34
End: 2024-04-22

## 2024-04-22 DIAGNOSIS — K21.9 GASTROESOPHAGEAL REFLUX DISEASE, UNSPECIFIED WHETHER ESOPHAGITIS PRESENT: ICD-10-CM

## 2024-04-22 DIAGNOSIS — M32.9 LUPUS (HCC): ICD-10-CM

## 2024-04-22 DIAGNOSIS — M79.642 BILATERAL HAND PAIN: ICD-10-CM

## 2024-04-22 DIAGNOSIS — F51.04 PSYCHOPHYSIOLOGICAL INSOMNIA: ICD-10-CM

## 2024-04-22 DIAGNOSIS — M79.641 BILATERAL HAND PAIN: ICD-10-CM

## 2024-04-22 DIAGNOSIS — F51.5 NIGHTMARES ASSOCIATED WITH CHRONIC POST-TRAUMATIC STRESS DISORDER: ICD-10-CM

## 2024-04-22 DIAGNOSIS — F43.12 NIGHTMARES ASSOCIATED WITH CHRONIC POST-TRAUMATIC STRESS DISORDER: ICD-10-CM

## 2024-04-22 DIAGNOSIS — I73.00 RAYNAUD'S DISEASE WITHOUT GANGRENE: ICD-10-CM

## 2024-04-22 RX ORDER — MELOXICAM 15 MG/1
15 TABLET ORAL DAILY
Qty: 30 TABLET | Refills: 5 | Status: SHIPPED | OUTPATIENT
Start: 2024-04-22

## 2024-04-22 RX ORDER — OMEPRAZOLE 20 MG/1
CAPSULE, DELAYED RELEASE ORAL
Qty: 180 CAPSULE | Refills: 3 | Status: SHIPPED | OUTPATIENT
Start: 2024-04-22

## 2024-04-22 RX ORDER — AMITRIPTYLINE HYDROCHLORIDE 100 MG/1
100 TABLET ORAL NIGHTLY
Qty: 90 TABLET | Refills: 1 | Status: SHIPPED | OUTPATIENT
Start: 2024-04-22

## 2024-04-22 RX ORDER — GABAPENTIN 300 MG/1
CAPSULE ORAL
Qty: 360 CAPSULE | Refills: 5 | Status: SHIPPED | OUTPATIENT
Start: 2024-04-22 | End: 2024-10-22

## 2024-04-22 RX ORDER — PRAZOSIN HYDROCHLORIDE 5 MG/1
5 CAPSULE ORAL NIGHTLY
Qty: 30 CAPSULE | Refills: 3 | Status: SHIPPED | OUTPATIENT
Start: 2024-04-22 | End: 2024-08-20

## 2024-04-22 RX ORDER — FLUOXETINE HYDROCHLORIDE 20 MG/1
20 CAPSULE ORAL EVERY MORNING
Qty: 30 CAPSULE | Refills: 3 | Status: SHIPPED | OUTPATIENT
Start: 2024-04-22 | End: 2024-08-20

## 2024-04-22 RX ORDER — FLUOXETINE HYDROCHLORIDE 40 MG/1
40 CAPSULE ORAL EVERY MORNING
Qty: 30 CAPSULE | Refills: 3 | Status: SHIPPED | OUTPATIENT
Start: 2024-04-22 | End: 2024-08-20

## 2024-04-22 RX ORDER — AMLODIPINE BESYLATE 2.5 MG/1
2.5 TABLET ORAL DAILY
Qty: 90 TABLET | Refills: 1 | Status: SHIPPED | OUTPATIENT
Start: 2024-04-22

## 2024-04-22 RX ORDER — PRAZOSIN HYDROCHLORIDE 1 MG/1
1 CAPSULE ORAL NIGHTLY
Qty: 30 CAPSULE | Refills: 3 | Status: SHIPPED | OUTPATIENT
Start: 2024-04-22 | End: 2024-08-20

## 2024-04-22 NOTE — TELEPHONE ENCOUNTER
Pam from [facility] called and needs to get patients medication changed to [facility] pharmacy they get medication free    Elavil  Norvasc  Neurontin  Mobic  Prilosec  Any questions please call Pam at    [phone]

## 2024-04-22 NOTE — TELEPHONE ENCOUNTER
Pam from [facility] called and need prescription sent to [facility] , they get the medication free there now.   Prozac   Minipress 1 MG  Minipress 5 mg  Please advise  Any questions please call Pam at [phone]

## 2024-04-25 ENCOUNTER — HOSPITAL ENCOUNTER (OUTPATIENT)
Dept: CT IMAGING | Age: 34
Discharge: HOME | End: 2024-04-25

## 2024-04-25 DIAGNOSIS — R31.0 GROSS HEMATURIA: ICD-10-CM

## 2024-04-25 PROCEDURE — 74176 CT ABD & PELVIS W/O CONTRAST: CPT

## 2024-05-10 ENCOUNTER — OFFICE VISIT (OUTPATIENT)
Dept: BEHAVIORAL/MENTAL HEALTH CLINIC | Age: 34
End: 2024-05-10

## 2024-05-10 VITALS
HEART RATE: 107 BPM | OXYGEN SATURATION: 98 % | SYSTOLIC BLOOD PRESSURE: 110 MMHG | BODY MASS INDEX: 33.77 KG/M2 | DIASTOLIC BLOOD PRESSURE: 88 MMHG | HEIGHT: 60 IN | WEIGHT: 172 LBS

## 2024-05-10 DIAGNOSIS — F43.10 PTSD (POST-TRAUMATIC STRESS DISORDER): ICD-10-CM

## 2024-05-10 DIAGNOSIS — F51.04 PSYCHOPHYSIOLOGICAL INSOMNIA: ICD-10-CM

## 2024-05-10 DIAGNOSIS — F41.9 ANXIETY: Primary | ICD-10-CM

## 2024-05-10 DIAGNOSIS — F43.12 NIGHTMARES ASSOCIATED WITH CHRONIC POST-TRAUMATIC STRESS DISORDER: ICD-10-CM

## 2024-05-10 DIAGNOSIS — F51.5 NIGHTMARES ASSOCIATED WITH CHRONIC POST-TRAUMATIC STRESS DISORDER: ICD-10-CM

## 2024-05-10 PROCEDURE — 99215 OFFICE O/P EST HI 40 MIN: CPT | Performed by: NURSE PRACTITIONER

## 2024-05-10 RX ORDER — PRAZOSIN HYDROCHLORIDE 5 MG/1
5 CAPSULE ORAL NIGHTLY
Qty: 90 CAPSULE | Refills: 1 | Status: SHIPPED | OUTPATIENT
Start: 2024-05-10 | End: 2024-11-06

## 2024-05-10 RX ORDER — PRAZOSIN HYDROCHLORIDE 1 MG/1
1 CAPSULE ORAL NIGHTLY
Qty: 90 CAPSULE | Refills: 1 | Status: SHIPPED | OUTPATIENT
Start: 2024-05-10 | End: 2024-11-06

## 2024-05-10 RX ORDER — FLUOXETINE HYDROCHLORIDE 20 MG/1
20 CAPSULE ORAL EVERY MORNING
Qty: 90 CAPSULE | Refills: 1 | Status: SHIPPED | OUTPATIENT
Start: 2024-05-10 | End: 2024-11-06

## 2024-05-10 RX ORDER — FLUOXETINE HYDROCHLORIDE 40 MG/1
40 CAPSULE ORAL EVERY MORNING
Qty: 90 CAPSULE | Refills: 1 | Status: SHIPPED | OUTPATIENT
Start: 2024-05-10 | End: 2024-11-06

## 2024-05-10 ASSESSMENT — PATIENT HEALTH QUESTIONNAIRE - PHQ9
8. MOVING OR SPEAKING SO SLOWLY THAT OTHER PEOPLE COULD HAVE NOTICED. OR THE OPPOSITE, BEING SO FIGETY OR RESTLESS THAT YOU HAVE BEEN MOVING AROUND A LOT MORE THAN USUAL: NOT AT ALL
9. THOUGHTS THAT YOU WOULD BE BETTER OFF DEAD, OR OF HURTING YOURSELF: NOT AT ALL
2. FEELING DOWN, DEPRESSED OR HOPELESS: SEVERAL DAYS
4. FEELING TIRED OR HAVING LITTLE ENERGY: NEARLY EVERY DAY
1. LITTLE INTEREST OR PLEASURE IN DOING THINGS: NOT AT ALL
SUM OF ALL RESPONSES TO PHQ QUESTIONS 1-9: 10
5. POOR APPETITE OR OVEREATING: NOT AT ALL
10. IF YOU CHECKED OFF ANY PROBLEMS, HOW DIFFICULT HAVE THESE PROBLEMS MADE IT FOR YOU TO DO YOUR WORK, TAKE CARE OF THINGS AT HOME, OR GET ALONG WITH OTHER PEOPLE: VERY DIFFICULT
SUM OF ALL RESPONSES TO PHQ QUESTIONS 1-9: 10
3. TROUBLE FALLING OR STAYING ASLEEP: NEARLY EVERY DAY
SUM OF ALL RESPONSES TO PHQ QUESTIONS 1-9: 10
SUM OF ALL RESPONSES TO PHQ9 QUESTIONS 1 & 2: 1
7. TROUBLE CONCENTRATING ON THINGS, SUCH AS READING THE NEWSPAPER OR WATCHING TELEVISION: NEARLY EVERY DAY
6. FEELING BAD ABOUT YOURSELF - OR THAT YOU ARE A FAILURE OR HAVE LET YOURSELF OR YOUR FAMILY DOWN: NOT AT ALL
SUM OF ALL RESPONSES TO PHQ QUESTIONS 1-9: 10

## 2024-05-10 NOTE — PROGRESS NOTES
OUTPATIENT PSYCHIATRIC RETURN VISIT PROGRESS NOTE    Date of Service: 5/10/2024     Identification    Tiffanie Tejeda is a 34 y.o.  with a past psychiatric history of anxiety, PTSD, nightmares, insomnia  who presents today for a psychiatric follow up appointment.      CC:  Routine medication management follow up.    Chief Complaint   Patient presents with    Follow-up     3 month follow up         Subjective / Interval History:    Pt comes to clinic today and reports that mood stable with current medication regimen. Reports situational stressors that contribute to her feeling overwhelmed at times. Says she has accrued medical debt that she was not aware of, is unable to start classes as she had planned due to her former  not following through , had missed some medical appointments due to not being placed on schedule appropriately and ran out of medication. Events have led her to feel like giving up on the program at times. Says she feels \"unworthy\" and experiences negative self-talk related to past trauma that can be powerful at times. She is struggling with a lot of pain related to Lupus and says medication has not yet been effective. She does yoga regularly which brings some relief. 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   Pt has been medication compliant and denies any side-effects. Pt denies SI, HI and AVH. Does not endorse any manic or psychotic symptoms.    Psychiatric Review Of Systems:  Sleep: nightime awakenings due to pain  Appetite: ok  Current suicidal/homicidal ideations: Denies SI/ HI  Current auditory/visual hallucinations: Denies     Medications:    Current Outpatient Medications:     amitriptyline (ELAVIL) 100 MG tablet, Take 1 tablet by mouth nightly, Disp: 90 tablet, Rfl: 1    amLODIPine (NORVASC) 2.5 MG tablet, Take 1 tablet by mouth daily, Disp: 90

## 2024-05-16 ENCOUNTER — OFFICE VISIT (OUTPATIENT)
Dept: RHEUMATOLOGY | Age: 34
End: 2024-05-16

## 2024-05-16 VITALS
HEIGHT: 60 IN | SYSTOLIC BLOOD PRESSURE: 132 MMHG | HEART RATE: 106 BPM | DIASTOLIC BLOOD PRESSURE: 86 MMHG | BODY MASS INDEX: 34.55 KG/M2 | WEIGHT: 176 LBS

## 2024-05-16 DIAGNOSIS — M32.9 LUPUS (HCC): ICD-10-CM

## 2024-05-16 DIAGNOSIS — M79.7 FIBROMYALGIA: ICD-10-CM

## 2024-05-16 DIAGNOSIS — M32.9 LUPUS (HCC): Primary | ICD-10-CM

## 2024-05-16 DIAGNOSIS — Z79.899 LONG-TERM USE OF HIGH-RISK MEDICATION: ICD-10-CM

## 2024-05-16 DIAGNOSIS — I73.00 RAYNAUD'S DISEASE WITHOUT GANGRENE: ICD-10-CM

## 2024-05-16 LAB
BASOPHILS # BLD: 0 K/UL (ref 0–0.2)
BASOPHILS NFR BLD: 1 % (ref 0–2)
DIFFERENTIAL METHOD BLD: ABNORMAL
EOSINOPHIL # BLD: 0.2 K/UL (ref 0–0.8)
EOSINOPHIL NFR BLD: 4 % (ref 0.5–7.8)
ERYTHROCYTE [DISTWIDTH] IN BLOOD BY AUTOMATED COUNT: 13.2 % (ref 11.9–14.6)
HCT VFR BLD AUTO: 41.3 % (ref 35.8–46.3)
HGB BLD-MCNC: 13.7 G/DL (ref 11.7–15.4)
IMM GRANULOCYTES # BLD AUTO: 0 K/UL (ref 0–0.5)
IMM GRANULOCYTES NFR BLD AUTO: 0 % (ref 0–5)
LYMPHOCYTES # BLD: 1 K/UL (ref 0.5–4.6)
LYMPHOCYTES NFR BLD: 23 % (ref 13–44)
MCH RBC QN AUTO: 29.3 PG (ref 26.1–32.9)
MCHC RBC AUTO-ENTMCNC: 33.2 G/DL (ref 31.4–35)
MCV RBC AUTO: 88.4 FL (ref 82–102)
MONOCYTES # BLD: 0.2 K/UL (ref 0.1–1.3)
MONOCYTES NFR BLD: 5 % (ref 4–12)
NEUTS SEG # BLD: 2.8 K/UL (ref 1.7–8.2)
NEUTS SEG NFR BLD: 67 % (ref 43–78)
NRBC # BLD: 0 K/UL (ref 0–0.2)
PLATELET # BLD AUTO: 227 K/UL (ref 150–450)
PMV BLD AUTO: 9.8 FL (ref 9.4–12.3)
RBC # BLD AUTO: 4.67 M/UL (ref 4.05–5.2)
WBC # BLD AUTO: 4.1 K/UL (ref 4.3–11.1)

## 2024-05-16 PROCEDURE — 99215 OFFICE O/P EST HI 40 MIN: CPT | Performed by: INTERNAL MEDICINE

## 2024-05-16 RX ORDER — HYDROXYCHLOROQUINE SULFATE 200 MG/1
TABLET, FILM COATED ORAL
Qty: 60 TABLET | Refills: 3 | Status: SHIPPED | OUTPATIENT
Start: 2024-05-16

## 2024-05-16 RX ORDER — DULOXETIN HYDROCHLORIDE 30 MG/1
CAPSULE, DELAYED RELEASE ORAL
Qty: 30 CAPSULE | Refills: 3 | Status: SHIPPED | OUTPATIENT
Start: 2024-05-16

## 2024-05-16 ASSESSMENT — ROUTINE ASSESSMENT OF PATIENT INDEX DATA (RAPID3)
WHEN YOU AWAKENED IN THE MORNING OVER THE LAST WEEK, PLEASE INDICATE THE AMOUNT OF TIME IT TAKES UNTIL YOU ARE AS LIMBER AS YOU WILL BE FOR THE DAY: 45 MIN
ON A SCALE OF ONE TO TEN, HOW MUCH OF A PROBLEM HAS UNUSUAL FATIGUE OR TIREDNESS BEEN FOR YOU OVER THE PAST WEEK?: 9
ON A SCALE OF ONE TO TEN, CONSIDERING ALL THE WAYS IN WHICH ILLNESS AND HEALTH CONDITIONS MAY AFFECT YOU AT THIS TIME, PLEASE INDICATE BELOW HOW YOU ARE DOING:: 8
ON A SCALE OF ONE TO TEN, HOW DIFFICULT WAS IT FOR YOU TO COMPLETE THE LISTED DAILY PHYSICAL TASKS OVER THE LAST WEEK: 15
ON A SCALE OF ONE TO TEN, HOW MUCH PAIN HAVE YOU HAD BECAUSE OF YOUR CONDITION OVER THE PAST WEEK?: 8

## 2024-05-16 ASSESSMENT — PATIENT HEALTH QUESTIONNAIRE - PHQ9
5. POOR APPETITE OR OVEREATING: MORE THAN HALF THE DAYS
9. THOUGHTS THAT YOU WOULD BE BETTER OFF DEAD, OR OF HURTING YOURSELF: NOT AT ALL
7. TROUBLE CONCENTRATING ON THINGS, SUCH AS READING THE NEWSPAPER OR WATCHING TELEVISION: MORE THAN HALF THE DAYS
4. FEELING TIRED OR HAVING LITTLE ENERGY: MORE THAN HALF THE DAYS
SUM OF ALL RESPONSES TO PHQ9 QUESTIONS 1 & 2: 4
1. LITTLE INTEREST OR PLEASURE IN DOING THINGS: MORE THAN HALF THE DAYS
3. TROUBLE FALLING OR STAYING ASLEEP: MORE THAN HALF THE DAYS
6. FEELING BAD ABOUT YOURSELF - OR THAT YOU ARE A FAILURE OR HAVE LET YOURSELF OR YOUR FAMILY DOWN: SEVERAL DAYS
SUM OF ALL RESPONSES TO PHQ QUESTIONS 1-9: 13
SUM OF ALL RESPONSES TO PHQ QUESTIONS 1-9: 13
2. FEELING DOWN, DEPRESSED OR HOPELESS: MORE THAN HALF THE DAYS
10. IF YOU CHECKED OFF ANY PROBLEMS, HOW DIFFICULT HAVE THESE PROBLEMS MADE IT FOR YOU TO DO YOUR WORK, TAKE CARE OF THINGS AT HOME, OR GET ALONG WITH OTHER PEOPLE: VERY DIFFICULT
SUM OF ALL RESPONSES TO PHQ QUESTIONS 1-9: 13
8. MOVING OR SPEAKING SO SLOWLY THAT OTHER PEOPLE COULD HAVE NOTICED. OR THE OPPOSITE, BEING SO FIGETY OR RESTLESS THAT YOU HAVE BEEN MOVING AROUND A LOT MORE THAN USUAL: NOT AT ALL
SUM OF ALL RESPONSES TO PHQ QUESTIONS 1-9: 13

## 2024-05-16 ASSESSMENT — COLUMBIA-SUICIDE SEVERITY RATING SCALE - C-SSRS
1. WITHIN THE PAST MONTH, HAVE YOU WISHED YOU WERE DEAD OR WISHED YOU COULD GO TO SLEEP AND NOT WAKE UP?: NO
6. HAVE YOU EVER DONE ANYTHING, STARTED TO DO ANYTHING, OR PREPARED TO DO ANYTHING TO END YOUR LIFE?: NO
2. HAVE YOU ACTUALLY HAD ANY THOUGHTS OF KILLING YOURSELF?: NO

## 2024-05-16 ASSESSMENT — JOINT PAIN
TOTAL NUMBER OF TENDER JOINTS: 22
TOTAL NUMBER OF SWOLLEN JOINTS: 0

## 2024-05-18 LAB
C3 SERPL-MCNC: 116 MG/DL (ref 82–167)
C4 SERPL-MCNC: 19 MG/DL (ref 12–38)
DSDNA AB SER-ACNC: 15 IU/ML (ref 0–9)

## 2024-06-02 NOTE — PROGRESS NOTES
Tiffanie Tejeda is a 34 y.o. female who presents today for the following:  Chief Complaint   Patient presents with    Follow-up     Pt presents today for FU.        No Known Allergies    Current Outpatient Medications   Medication Sig Dispense Refill    hydroxychloroquine (PLAQUENIL) 200 MG tablet Take 1 pill twice a day after food. 60 tablet 3    DULoxetine (CYMBALTA) 30 MG extended release capsule Take 1 pill once a day after supper. 30 capsule 3    FLUoxetine (PROZAC) 20 MG capsule Take 1 capsule by mouth every morning Take with the 40 mg capsule to equal 60 mg daily. 90 capsule 1    FLUoxetine (PROZAC) 40 MG capsule Take 1 capsule by mouth every morning 90 capsule 1    prazosin (MINIPRESS) 1 MG capsule Take 1 capsule by mouth nightly Take with the 5 mg capsule to equal 6 mg nightly 90 capsule 1    prazosin (MINIPRESS) 5 MG capsule Take 1 capsule by mouth nightly 90 capsule 1    amitriptyline (ELAVIL) 100 MG tablet Take 1 tablet by mouth nightly 90 tablet 1    amLODIPine (NORVASC) 2.5 MG tablet Take 1 tablet by mouth daily 90 tablet 1    meloxicam (MOBIC) 15 MG tablet Take 1 tablet by mouth daily 30 tablet 5    omeprazole (PRILOSEC) 20 MG delayed release capsule Take bid before meals prn acid reflux 180 capsule 3    gabapentin (NEURONTIN) 300 MG capsule Take one tab PO in AM, one tab PO in afternoon and two tabs PO at bedtime 360 capsule 5    ibuprofen (ADVIL;MOTRIN) 200 MG tablet Take 1 tablet by mouth every 6 hours as needed for Pain      famotidine (PEPCID) 40 MG tablet Take 1 tablet by mouth every evening 90 tablet 3    triamcinolone (KENALOG) 0.1 % cream Apply topically 2 times daily. 30 g 2    buprenorphine-naloxone (SUBOXONE) 8-2 MG FILM SL film Place 1 Film under the tongue in the morning and at bedtime.       No current facility-administered medications for this visit.       Past Medical History:   Diagnosis Date    Abnormal Pap smear of cervix     Addiction to drug ([facility])     Anxiety and depression

## 2024-06-04 ENCOUNTER — OFFICE VISIT (OUTPATIENT)
Dept: FAMILY MEDICINE CLINIC | Facility: CLINIC | Age: 34
End: 2024-06-04

## 2024-06-04 VITALS
HEART RATE: 96 BPM | TEMPERATURE: 98.2 F | DIASTOLIC BLOOD PRESSURE: 76 MMHG | BODY MASS INDEX: 33.98 KG/M2 | OXYGEN SATURATION: 98 % | WEIGHT: 174 LBS | SYSTOLIC BLOOD PRESSURE: 112 MMHG

## 2024-06-04 DIAGNOSIS — M79.641 BILATERAL HAND PAIN: ICD-10-CM

## 2024-06-04 DIAGNOSIS — R30.0 DYSURIA: ICD-10-CM

## 2024-06-04 DIAGNOSIS — I73.00 RAYNAUD'S DISEASE WITHOUT GANGRENE: ICD-10-CM

## 2024-06-04 DIAGNOSIS — F19.11 DRUG ABUSE IN REMISSION (HCC): ICD-10-CM

## 2024-06-04 DIAGNOSIS — R21 RASH AND NONSPECIFIC SKIN ERUPTION: ICD-10-CM

## 2024-06-04 DIAGNOSIS — M79.642 BILATERAL HAND PAIN: ICD-10-CM

## 2024-06-04 DIAGNOSIS — F51.04 PSYCHOPHYSIOLOGICAL INSOMNIA: ICD-10-CM

## 2024-06-04 DIAGNOSIS — N89.8 VAGINAL DISCHARGE: ICD-10-CM

## 2024-06-04 DIAGNOSIS — N89.8 VAGINAL DISCHARGE: Primary | ICD-10-CM

## 2024-06-04 DIAGNOSIS — F41.9 ANXIETY: ICD-10-CM

## 2024-06-04 DIAGNOSIS — K21.9 GASTROESOPHAGEAL REFLUX DISEASE, UNSPECIFIED WHETHER ESOPHAGITIS PRESENT: ICD-10-CM

## 2024-06-04 DIAGNOSIS — F43.10 PTSD (POST-TRAUMATIC STRESS DISORDER): ICD-10-CM

## 2024-06-04 DIAGNOSIS — M32.9 LUPUS (HCC): ICD-10-CM

## 2024-06-04 PROCEDURE — 99214 OFFICE O/P EST MOD 30 MIN: CPT | Performed by: FAMILY MEDICINE

## 2024-06-04 RX ORDER — TRIAMCINOLONE ACETONIDE 1 MG/G
CREAM TOPICAL
Qty: 30 G | Refills: 2 | Status: SHIPPED | OUTPATIENT
Start: 2024-06-04

## 2024-06-04 RX ORDER — MELOXICAM 15 MG/1
15 TABLET ORAL DAILY
Qty: 30 TABLET | Refills: 5 | Status: SHIPPED | OUTPATIENT
Start: 2024-06-04

## 2024-06-04 RX ORDER — AMLODIPINE BESYLATE 2.5 MG/1
2.5 TABLET ORAL DAILY
Qty: 90 TABLET | Refills: 1 | Status: SHIPPED | OUTPATIENT
Start: 2024-06-04

## 2024-06-04 RX ORDER — GABAPENTIN 300 MG/1
CAPSULE ORAL
Qty: 360 CAPSULE | Refills: 5 | Status: SHIPPED | OUTPATIENT
Start: 2024-06-04 | End: 2024-12-04

## 2024-06-04 RX ORDER — OMEPRAZOLE 20 MG/1
CAPSULE, DELAYED RELEASE ORAL
Qty: 180 CAPSULE | Refills: 3 | Status: SHIPPED | OUTPATIENT
Start: 2024-06-04

## 2024-06-04 ASSESSMENT — ENCOUNTER SYMPTOMS
ABDOMINAL PAIN: 0
CONSTIPATION: 1

## 2024-06-04 NOTE — ASSESSMENT & PLAN NOTE
Will check with psychiatry if desires to lower dose of fluoxetine since now also taking duloxetine.   show

## 2024-06-05 ENCOUNTER — OFFICE VISIT (OUTPATIENT)
Dept: FAMILY MEDICINE CLINIC | Facility: CLINIC | Age: 34
End: 2024-06-05

## 2024-06-05 VITALS
HEART RATE: 115 BPM | DIASTOLIC BLOOD PRESSURE: 64 MMHG | HEIGHT: 60 IN | SYSTOLIC BLOOD PRESSURE: 120 MMHG | OXYGEN SATURATION: 98 % | BODY MASS INDEX: 33.96 KG/M2 | WEIGHT: 173 LBS

## 2024-06-05 DIAGNOSIS — F41.0 PANIC DISORDER: Primary | ICD-10-CM

## 2024-06-05 PROCEDURE — 99212 OFFICE O/P EST SF 10 MIN: CPT | Performed by: FAMILY MEDICINE

## 2024-06-05 RX ORDER — LORAZEPAM 1 MG/1
TABLET ORAL
Qty: 1 TABLET | Refills: 0 | Status: SHIPPED | OUTPATIENT
Start: 2024-06-05 | End: 2024-06-10

## 2024-06-05 NOTE — PROGRESS NOTES
Physical Exam  Vitals reviewed.   Constitutional:       Appearance: Normal appearance.   Cardiovascular:      Rate and Rhythm: Normal rate and regular rhythm.      Heart sounds: Normal heart sounds. No murmur heard.  Pulmonary:      Effort: Pulmonary effort is normal.      Breath sounds: Normal breath sounds.   Musculoskeletal:      Cervical back: Neck supple.   Neurological:      Mental Status: She is alert.   Psychiatric:      Comments: Tearful anxious          1. Panic disorder  Patient continues to decline pelvic exam.  Will pretreat with ativan prior to appointment tomorrow.  Pelvic floor therapist removed glue from vaginal area this morning.  Reports white discharge and burning sensation.  Will reschedule for 11 AM tomorrow.   -     LORazepam (ATIVAN) 1 MG tablet; Take one tab PO 30 minutes prior to office appointment, Disp-1 tablet, R-0Normal       Patient informed, we will call with blood work results within one week.  If you have not heard regarding results in over a week, please contact office.  You can also review results on [facility].           Chencho Jackson MD

## 2024-06-06 ENCOUNTER — OFFICE VISIT (OUTPATIENT)
Dept: FAMILY MEDICINE CLINIC | Facility: CLINIC | Age: 34
End: 2024-06-06

## 2024-06-06 VITALS
WEIGHT: 173 LBS | BODY MASS INDEX: 33.79 KG/M2 | DIASTOLIC BLOOD PRESSURE: 66 MMHG | OXYGEN SATURATION: 99 % | SYSTOLIC BLOOD PRESSURE: 122 MMHG | HEART RATE: 111 BPM

## 2024-06-06 DIAGNOSIS — L24.9 IRRITANT CONTACT DERMATITIS, UNSPECIFIED TRIGGER: ICD-10-CM

## 2024-06-06 DIAGNOSIS — N76.0 VAGINITIS: ICD-10-CM

## 2024-06-06 DIAGNOSIS — S39.93XA INJURY OF VAGINA, INITIAL ENCOUNTER: Primary | ICD-10-CM

## 2024-06-06 PROCEDURE — 99213 OFFICE O/P EST LOW 20 MIN: CPT | Performed by: FAMILY MEDICINE

## 2024-06-06 RX ORDER — METRONIDAZOLE 500 MG/1
500 TABLET ORAL 2 TIMES DAILY
Qty: 14 TABLET | Refills: 0 | Status: SHIPPED | OUTPATIENT
Start: 2024-06-06 | End: 2024-06-13

## 2024-06-06 RX ORDER — MINERAL OIL/HYDROPHIL PETROLAT
OINTMENT (GRAM) TOPICAL
Qty: 50 G | Refills: 3 | Status: SHIPPED | OUTPATIENT
Start: 2024-06-06
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
## 2024-06-09 LAB
A VAGINAE DNA VAG QL NAA+PROBE: NORMAL SCORE
BVAB2 DNA VAG QL NAA+PROBE: NORMAL SCORE
C ALBICANS DNA VAG QL NAA+PROBE: NEGATIVE
C GLABRATA DNA VAG QL NAA+PROBE: NEGATIVE
MEGA1 DNA VAG QL NAA+PROBE: NORMAL SCORE
SPECIMEN SOURCE: NORMAL
T VAGINALIS RRNA SPEC QL NAA+PROBE: NEGATIVE

## 2024-06-18 ENCOUNTER — OFFICE VISIT (OUTPATIENT)
Dept: RHEUMATOLOGY | Age: 34
End: 2024-06-18

## 2024-06-18 VITALS
WEIGHT: 176.6 LBS | HEIGHT: 60 IN | SYSTOLIC BLOOD PRESSURE: 111 MMHG | BODY MASS INDEX: 34.67 KG/M2 | DIASTOLIC BLOOD PRESSURE: 79 MMHG | HEART RATE: 109 BPM

## 2024-06-18 DIAGNOSIS — Z79.899 LONG-TERM USE OF HIGH-RISK MEDICATION: ICD-10-CM

## 2024-06-18 DIAGNOSIS — M79.7 FIBROMYALGIA: ICD-10-CM

## 2024-06-18 DIAGNOSIS — M32.9 LUPUS (HCC): Primary | ICD-10-CM

## 2024-06-18 DIAGNOSIS — I73.00 RAYNAUD'S DISEASE WITHOUT GANGRENE: ICD-10-CM

## 2024-06-18 PROCEDURE — 99214 OFFICE O/P EST MOD 30 MIN: CPT | Performed by: INTERNAL MEDICINE

## 2024-06-18 RX ORDER — HYDROXYCHLOROQUINE SULFATE 200 MG/1
TABLET, FILM COATED ORAL
Qty: 60 TABLET | Refills: 3 | Status: SHIPPED | OUTPATIENT
Start: 2024-06-18

## 2024-06-18 RX ORDER — DULOXETIN HYDROCHLORIDE 30 MG/1
CAPSULE, DELAYED RELEASE ORAL
Qty: 30 CAPSULE | Refills: 3 | Status: SHIPPED | OUTPATIENT
Start: 2024-06-18 | End: 2024-06-20

## 2024-06-18 ASSESSMENT — JOINT PAIN
TOTAL NUMBER OF SWOLLEN JOINTS: 2
TOTAL NUMBER OF TENDER JOINTS: 12

## 2024-06-18 ASSESSMENT — ROUTINE ASSESSMENT OF PATIENT INDEX DATA (RAPID3)
ON A SCALE OF ONE TO TEN, HOW MUCH OF A PROBLEM HAS UNUSUAL FATIGUE OR TIREDNESS BEEN FOR YOU OVER THE PAST WEEK?: 10
ON A SCALE OF ONE TO TEN, CONSIDERING ALL THE WAYS IN WHICH ILLNESS AND HEALTH CONDITIONS MAY AFFECT YOU AT THIS TIME, PLEASE INDICATE BELOW HOW YOU ARE DOING:: 8
ON A SCALE OF ONE TO TEN, HOW MUCH PAIN HAVE YOU HAD BECAUSE OF YOUR CONDITION OVER THE PAST WEEK?: 8
ON A SCALE OF ONE TO TEN, HOW DIFFICULT WAS IT FOR YOU TO COMPLETE THE LISTED DAILY PHYSICAL TASKS OVER THE LAST WEEK: 1.2
WHEN YOU AWAKENED IN THE MORNING OVER THE LAST WEEK, PLEASE INDICATE THE AMOUNT OF TIME IT TAKES UNTIL YOU ARE AS LIMBER AS YOU WILL BE FOR THE DAY: 45 MIN

## 2024-06-18 NOTE — PROGRESS NOTES
need to have an eye exam by March of next year.  -     hydroxychloroquine (PLAQUENIL) 200 MG tablet; Take 1 pill twice a day after food.  -     C4 Complement; Future  -     Anti-DNA Antibody, Double-Stranded; Future  -     C3 Complement; Future    Fibromyalgia: Patient was instructed to continue duloxetine 30 mg to be taken 1 pill once a day after supper.  I did instruct her to start doing exercises to help with her  strength in her hands.  -     DULoxetine (CYMBALTA) 30 MG extended release capsule; Take 1 pill once a day after supper.    Raynaud's disease without gangrene: Patient was instructed to continue aspirin 81 mg to be taken once a day for now.  I did instruct her to keep her hands and her feet as warm as possible to help with her Raynaud's as well.    Long-term use of high-risk medication: Lab results from the last visit were reviewed with the patient today.  If there is any noted abnormality from today's labs I will keep the patient informed but if not I will review her labs with her on her follow-up visit.  -     CBC with Auto Differential; Future  -     Comprehensive Metabolic Panel; Future    Disease activity plan:  As stated above.    Steroid management plan:  As stated above, if applicable.    Pain management plan:  As stated above, if applicable.    Weight management plan:  Weight loss through diet and exercise is always encouraged    Disease prognosis: Good    I appreciate the opportunity to continue to participate in the care of this patient.     Follow-up and Dispositions    Return in about 4 months (around 10/18/2024).       Electronically signed by:  Lisa Calhoun MD      This note was dictated using dragon voice recognition software.  It has been proofread, but there may still exist voice recognition errors that the author did not

## 2024-06-20 ENCOUNTER — OFFICE VISIT (OUTPATIENT)
Dept: BEHAVIORAL/MENTAL HEALTH CLINIC | Age: 34
End: 2024-06-20

## 2024-06-20 VITALS
WEIGHT: 176.2 LBS | SYSTOLIC BLOOD PRESSURE: 108 MMHG | OXYGEN SATURATION: 97 % | HEART RATE: 112 BPM | DIASTOLIC BLOOD PRESSURE: 72 MMHG | HEIGHT: 60 IN | BODY MASS INDEX: 34.59 KG/M2

## 2024-06-20 DIAGNOSIS — F51.5 NIGHTMARES ASSOCIATED WITH CHRONIC POST-TRAUMATIC STRESS DISORDER: ICD-10-CM

## 2024-06-20 DIAGNOSIS — F41.9 ANXIETY: Primary | ICD-10-CM

## 2024-06-20 DIAGNOSIS — M79.642 BILATERAL HAND PAIN: ICD-10-CM

## 2024-06-20 DIAGNOSIS — M79.641 BILATERAL HAND PAIN: ICD-10-CM

## 2024-06-20 DIAGNOSIS — F43.10 PTSD (POST-TRAUMATIC STRESS DISORDER): ICD-10-CM

## 2024-06-20 DIAGNOSIS — F51.04 PSYCHOPHYSIOLOGICAL INSOMNIA: ICD-10-CM

## 2024-06-20 DIAGNOSIS — M79.7 FIBROMYALGIA: ICD-10-CM

## 2024-06-20 DIAGNOSIS — F43.12 NIGHTMARES ASSOCIATED WITH CHRONIC POST-TRAUMATIC STRESS DISORDER: ICD-10-CM

## 2024-06-20 PROCEDURE — 99215 OFFICE O/P EST HI 40 MIN: CPT | Performed by: NURSE PRACTITIONER

## 2024-06-20 RX ORDER — GABAPENTIN 300 MG/1
600 CAPSULE ORAL 3 TIMES DAILY
Qty: 540 CAPSULE | Refills: 0 | Status: SHIPPED | OUTPATIENT
Start: 2024-06-20 | End: 2024-09-18

## 2024-06-20 RX ORDER — PRAZOSIN HYDROCHLORIDE 5 MG/1
5 CAPSULE ORAL NIGHTLY
Qty: 90 CAPSULE | Refills: 1 | Status: SHIPPED | OUTPATIENT
Start: 2024-06-20 | End: 2024-12-17

## 2024-06-20 RX ORDER — PRAZOSIN HYDROCHLORIDE 1 MG/1
1 CAPSULE ORAL NIGHTLY
Qty: 90 CAPSULE | Refills: 1 | Status: SHIPPED | OUTPATIENT
Start: 2024-06-20 | End: 2024-12-17

## 2024-06-20 RX ORDER — DULOXETIN HYDROCHLORIDE 60 MG/1
60 CAPSULE, DELAYED RELEASE ORAL EVERY MORNING
Qty: 90 CAPSULE | Refills: 2 | Status: SHIPPED | OUTPATIENT
Start: 2024-06-20 | End: 2025-03-17

## 2024-06-20 ASSESSMENT — PATIENT HEALTH QUESTIONNAIRE - PHQ9
SUM OF ALL RESPONSES TO PHQ QUESTIONS 1-9: 15
2. FEELING DOWN, DEPRESSED OR HOPELESS: MORE THAN HALF THE DAYS
7. TROUBLE CONCENTRATING ON THINGS, SUCH AS READING THE NEWSPAPER OR WATCHING TELEVISION: NEARLY EVERY DAY
SUM OF ALL RESPONSES TO PHQ QUESTIONS 1-9: 15
4. FEELING TIRED OR HAVING LITTLE ENERGY: NEARLY EVERY DAY
5. POOR APPETITE OR OVEREATING: SEVERAL DAYS
SUM OF ALL RESPONSES TO PHQ9 QUESTIONS 1 & 2: 4
6. FEELING BAD ABOUT YOURSELF - OR THAT YOU ARE A FAILURE OR HAVE LET YOURSELF OR YOUR FAMILY DOWN: SEVERAL DAYS
SUM OF ALL RESPONSES TO PHQ QUESTIONS 1-9: 15
1. LITTLE INTEREST OR PLEASURE IN DOING THINGS: MORE THAN HALF THE DAYS
SUM OF ALL RESPONSES TO PHQ QUESTIONS 1-9: 15
8. MOVING OR SPEAKING SO SLOWLY THAT OTHER PEOPLE COULD HAVE NOTICED. OR THE OPPOSITE, BEING SO FIGETY OR RESTLESS THAT YOU HAVE BEEN MOVING AROUND A LOT MORE THAN USUAL: NOT AT ALL
10. IF YOU CHECKED OFF ANY PROBLEMS, HOW DIFFICULT HAVE THESE PROBLEMS MADE IT FOR YOU TO DO YOUR WORK, TAKE CARE OF THINGS AT HOME, OR GET ALONG WITH OTHER PEOPLE: VERY DIFFICULT
9. THOUGHTS THAT YOU WOULD BE BETTER OFF DEAD, OR OF HURTING YOURSELF: NOT AT ALL
3. TROUBLE FALLING OR STAYING ASLEEP: NEARLY EVERY DAY

## 2024-06-20 NOTE — PROGRESS NOTES
OUTPATIENT PSYCHIATRIC RETURN VISIT PROGRESS NOTE      --Lucero Kwok, APRN - CNP on 6/20/2024 at 8:29 AM    An electronic signature was used to authenticate this note.   Date of Service: 6/20/2024     Identification    Tiffanie Tejeda is a 34 y.o.  with a past psychiatric history of anxiety, PTSD, nightmares, insomnia  who presents today for a psychiatric follow up appointment.      CC:  Routine medication management follow up.    Chief Complaint   Patient presents with    Insomnia     Reports worsening sleep, feeling jittery & excited but drained & emotionally exhausted        Subjective / Interval History:    Pt comes to clinic today and reports that she is having worsening Lupus pain . Medication for lupus  (plaquenil) has helped with skin problems, but no improvement in pain. Has had to call out of work due to Lupus pain and feels guilty. .All leading to worsening depressive symptoms, anxiety. She is having difficulty with sleep. Mood has worsened since last visit 5/10/24, more depressed, anxious, sleeping poorly. Consideration should be given to possible SE of plaquenil 's neuropsychiatric adverse reactions,  including depression, anxiety and insomnia if there is a correlation with it and the worsening of her symptoms.   She was recently diagnosed with fibromyalgia and started on duloxetine by rheumatology  at 30 mg in the evening. Dose has not been increased to effective dose of 60 mg and insomnia is a SE in 7-10 %.  Increase duloxetine to 60 mg and switch to AM dosing. and monitor.   Pt says she is going to change to another rheumatologist.  Discussed non-pharmacologic treatments for fibro including aquatic  physical therapy and OT to address her functional impairments. She is also advised that a consult with a registered dietician re: nutrition to address symptoms of fibromyalgia.She is provided with printed information  on these topics.   Pt has been medication compliant and denies any side-effects. Pt
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
## 2024-06-25 ENCOUNTER — OFFICE VISIT (OUTPATIENT)
Dept: FAMILY MEDICINE CLINIC | Facility: CLINIC | Age: 34
End: 2024-06-25

## 2024-06-25 VITALS
SYSTOLIC BLOOD PRESSURE: 122 MMHG | HEIGHT: 60 IN | HEART RATE: 104 BPM | DIASTOLIC BLOOD PRESSURE: 76 MMHG | WEIGHT: 178 LBS | OXYGEN SATURATION: 99 % | BODY MASS INDEX: 34.95 KG/M2

## 2024-06-25 DIAGNOSIS — M79.7 FIBROMYALGIA: Primary | ICD-10-CM

## 2024-06-25 DIAGNOSIS — M79.7 FIBROMYALGIA: ICD-10-CM

## 2024-06-25 DIAGNOSIS — E55.9 VITAMIN D DEFICIENCY: Primary | ICD-10-CM

## 2024-06-25 LAB — 25(OH)D3 SERPL-MCNC: 25.7 NG/ML (ref 30–100)

## 2024-06-25 PROCEDURE — 99214 OFFICE O/P EST MOD 30 MIN: CPT | Performed by: FAMILY MEDICINE

## 2024-06-25 RX ORDER — DULOXETIN HYDROCHLORIDE 60 MG/1
120 CAPSULE, DELAYED RELEASE ORAL EVERY MORNING
Qty: 180 CAPSULE | Refills: 2 | Status: SHIPPED | OUTPATIENT
Start: 2024-06-25 | End: 2025-03-22

## 2024-06-25 RX ORDER — CYCLOBENZAPRINE HCL 10 MG
10 TABLET ORAL NIGHTLY PRN
Qty: 30 TABLET | Refills: 5 | Status: SHIPPED | OUTPATIENT
Start: 2024-06-25

## 2024-06-25 ASSESSMENT — PATIENT HEALTH QUESTIONNAIRE - PHQ9
10. IF YOU CHECKED OFF ANY PROBLEMS, HOW DIFFICULT HAVE THESE PROBLEMS MADE IT FOR YOU TO DO YOUR WORK, TAKE CARE OF THINGS AT HOME, OR GET ALONG WITH OTHER PEOPLE: EXTREMELY DIFFICULT
SUM OF ALL RESPONSES TO PHQ QUESTIONS 1-9: 17
6. FEELING BAD ABOUT YOURSELF - OR THAT YOU ARE A FAILURE OR HAVE LET YOURSELF OR YOUR FAMILY DOWN: NEARLY EVERY DAY
9. THOUGHTS THAT YOU WOULD BE BETTER OFF DEAD, OR OF HURTING YOURSELF: NOT AT ALL
1. LITTLE INTEREST OR PLEASURE IN DOING THINGS: MORE THAN HALF THE DAYS
7. TROUBLE CONCENTRATING ON THINGS, SUCH AS READING THE NEWSPAPER OR WATCHING TELEVISION: NEARLY EVERY DAY
8. MOVING OR SPEAKING SO SLOWLY THAT OTHER PEOPLE COULD HAVE NOTICED. OR THE OPPOSITE, BEING SO FIGETY OR RESTLESS THAT YOU HAVE BEEN MOVING AROUND A LOT MORE THAN USUAL: NOT AT ALL
2. FEELING DOWN, DEPRESSED OR HOPELESS: MORE THAN HALF THE DAYS
SUM OF ALL RESPONSES TO PHQ QUESTIONS 1-9: 17
SUM OF ALL RESPONSES TO PHQ QUESTIONS 1-9: 17
SUM OF ALL RESPONSES TO PHQ9 QUESTIONS 1 & 2: 4
3. TROUBLE FALLING OR STAYING ASLEEP: NEARLY EVERY DAY
4. FEELING TIRED OR HAVING LITTLE ENERGY: NEARLY EVERY DAY
5. POOR APPETITE OR OVEREATING: SEVERAL DAYS
SUM OF ALL RESPONSES TO PHQ QUESTIONS 1-9: 17

## 2024-06-26 RX ORDER — ACETAMINOPHEN 160 MG
2000 TABLET,DISINTEGRATING ORAL DAILY
Qty: 90 CAPSULE | Refills: 3 | Status: SHIPPED | OUTPATIENT
Start: 2024-06-26

## 2024-07-02 ENCOUNTER — PATIENT MESSAGE (OUTPATIENT)
Dept: FAMILY MEDICINE CLINIC | Facility: CLINIC | Age: 34
End: 2024-07-02

## 2024-07-02 DIAGNOSIS — M79.7 FIBROMYALGIA: Primary | ICD-10-CM

## 2024-07-03 RX ORDER — CYCLOBENZAPRINE HCL 10 MG
10 TABLET ORAL NIGHTLY PRN
Qty: 90 TABLET | Refills: 3 | Status: SHIPPED | OUTPATIENT
Start: 2024-07-03

## 2024-07-03 NOTE — TELEPHONE ENCOUNTER
From: Tiffanie Tejeda  To: Dr. Chencho Jackson  Sent: 7/2/2024 4:51 PM EDT  Subject: Question about a Medication     Hi ,    I have a question about my prescription of Cymbalta; my case-worker said I’m supposed to be taking 2 now instead of one. Is this correct? I forget things a lot and wasn’t able to remember. Also, if possible can you send any/all my meds to [facility], rather than [facility].   
01-Jun-2023

## 2024-07-05 DIAGNOSIS — M79.642 BILATERAL HAND PAIN: ICD-10-CM

## 2024-07-05 DIAGNOSIS — M79.641 BILATERAL HAND PAIN: ICD-10-CM

## 2024-07-05 DIAGNOSIS — F51.5 NIGHTMARES ASSOCIATED WITH CHRONIC POST-TRAUMATIC STRESS DISORDER: ICD-10-CM

## 2024-07-05 DIAGNOSIS — F43.12 NIGHTMARES ASSOCIATED WITH CHRONIC POST-TRAUMATIC STRESS DISORDER: ICD-10-CM

## 2024-07-05 RX ORDER — GABAPENTIN 300 MG/1
600 CAPSULE ORAL 3 TIMES DAILY
Qty: 540 CAPSULE | Refills: 0 | Status: SHIPPED | OUTPATIENT
Start: 2024-07-05 | End: 2024-10-03

## 2024-07-05 RX ORDER — PRAZOSIN HYDROCHLORIDE 1 MG/1
1 CAPSULE ORAL NIGHTLY
Qty: 90 CAPSULE | Refills: 1 | Status: SHIPPED | OUTPATIENT
Start: 2024-07-05 | End: 2025-01-01

## 2024-07-05 RX ORDER — PRAZOSIN HYDROCHLORIDE 5 MG/1
5 CAPSULE ORAL NIGHTLY
Qty: 90 CAPSULE | Refills: 1 | Status: SHIPPED | OUTPATIENT
Start: 2024-07-05 | End: 2025-01-01
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
## 2024-07-23 ENCOUNTER — OFFICE VISIT (OUTPATIENT)
Dept: FAMILY MEDICINE CLINIC | Facility: CLINIC | Age: 34
End: 2024-07-23

## 2024-07-23 VITALS
DIASTOLIC BLOOD PRESSURE: 68 MMHG | WEIGHT: 181.4 LBS | OXYGEN SATURATION: 99 % | BODY MASS INDEX: 35.61 KG/M2 | HEART RATE: 95 BPM | SYSTOLIC BLOOD PRESSURE: 104 MMHG | HEIGHT: 60 IN

## 2024-07-23 DIAGNOSIS — M79.7 FIBROMYALGIA: ICD-10-CM

## 2024-07-23 DIAGNOSIS — R63.5 WEIGHT GAIN: Primary | ICD-10-CM

## 2024-07-23 DIAGNOSIS — R63.5 WEIGHT GAIN: ICD-10-CM

## 2024-07-23 DIAGNOSIS — M32.9 LUPUS (HCC): ICD-10-CM

## 2024-07-23 LAB
ESTRADIOL SERPL-MCNC: 44.2 PG/ML
TSH W FREE THYROID IF ABNORMAL: 1.79 UIU/ML (ref 0.27–4.2)

## 2024-07-23 PROCEDURE — 99214 OFFICE O/P EST MOD 30 MIN: CPT | Performed by: FAMILY MEDICINE

## 2024-07-23 RX ORDER — NALOXONE HYDROCHLORIDE 4 MG/.1ML
1 SPRAY NASAL PRN
COMMUNITY

## 2024-07-23 NOTE — PROGRESS NOTES
Tiffanie Tejeda is a 34 y.o. female who presents today for the following:  Chief Complaint   Patient presents with    Referral - General     Patient would like a referral to PT to work on muscle stiffness due to fibromyalgia.     Discuss Medications     Patient states her  and pelvic floor physical therapist thinks she may need \"hormones.\"       No Known Allergies    Current Outpatient Medications   Medication Sig Dispense Refill    gabapentin (NEURONTIN) 300 MG capsule Take 2 capsules by mouth 3 times daily for 90 days. 540 capsule 0    cyclobenzaprine (FLEXERIL) 10 MG tablet Take 1 tablet by mouth nightly as needed for Muscle spasms 90 tablet 3    Cholecalciferol (VITAMIN D3) 50 MCG (2000 UT) CAPS Take 1 capsule by mouth daily 90 capsule 3    DULoxetine (CYMBALTA) 60 MG extended release capsule Take 2 capsules by mouth every morning 180 capsule 2    Multiple Vitamin (MULTIVITAMIN ADULT PO) Take by mouth      hydroxychloroquine (PLAQUENIL) 200 MG tablet Take 1 pill twice a day after food. 60 tablet 3    omeprazole (PRILOSEC) 20 MG delayed release capsule Take bid before meals prn acid reflux 180 capsule 3    meloxicam (MOBIC) 15 MG tablet Take 1 tablet by mouth daily 30 tablet 5    amLODIPine (NORVASC) 2.5 MG tablet Take 1 tablet by mouth daily 90 tablet 1    amitriptyline (ELAVIL) 100 MG tablet Take 1 tablet by mouth nightly 90 tablet 1    ibuprofen (ADVIL;MOTRIN) 200 MG tablet Take 1 tablet by mouth every 6 hours as needed for Pain      famotidine (PEPCID) 40 MG tablet Take 1 tablet by mouth every evening 90 tablet 3    buprenorphine-naloxone (SUBOXONE) 8-2 MG FILM SL film Place 0.5 Film under the tongue daily.      naloxone 4 MG/0.1ML LIQD nasal spray 1 spray by Nasal route as needed for Opioid Reversal      prazosin (MINIPRESS) 1 MG capsule Take 1 capsule by mouth nightly Take with the 5 mg capsule to equal 6 mg nightly (Patient not taking: Reported on 7/23/2024) 90 capsule 1    prazosin

## 2024-08-01 ENCOUNTER — OFFICE VISIT (OUTPATIENT)
Dept: BEHAVIORAL/MENTAL HEALTH CLINIC | Age: 34
End: 2024-08-01

## 2024-08-01 VITALS
HEART RATE: 104 BPM | BODY MASS INDEX: 34.92 KG/M2 | WEIGHT: 178.8 LBS | SYSTOLIC BLOOD PRESSURE: 104 MMHG | OXYGEN SATURATION: 96 % | DIASTOLIC BLOOD PRESSURE: 70 MMHG

## 2024-08-01 DIAGNOSIS — M79.641 BILATERAL HAND PAIN: ICD-10-CM

## 2024-08-01 DIAGNOSIS — F43.10 PTSD (POST-TRAUMATIC STRESS DISORDER): ICD-10-CM

## 2024-08-01 DIAGNOSIS — F51.5 NIGHTMARES ASSOCIATED WITH CHRONIC POST-TRAUMATIC STRESS DISORDER: ICD-10-CM

## 2024-08-01 DIAGNOSIS — M79.642 BILATERAL HAND PAIN: ICD-10-CM

## 2024-08-01 DIAGNOSIS — F51.04 PSYCHOPHYSIOLOGICAL INSOMNIA: ICD-10-CM

## 2024-08-01 DIAGNOSIS — F43.12 NIGHTMARES ASSOCIATED WITH CHRONIC POST-TRAUMATIC STRESS DISORDER: ICD-10-CM

## 2024-08-01 DIAGNOSIS — F41.9 ANXIETY: Primary | ICD-10-CM

## 2024-08-01 PROCEDURE — 99215 OFFICE O/P EST HI 40 MIN: CPT | Performed by: NURSE PRACTITIONER

## 2024-08-01 RX ORDER — PRAZOSIN HYDROCHLORIDE 5 MG/1
5 CAPSULE ORAL NIGHTLY
Qty: 90 CAPSULE | Refills: 1 | Status: SHIPPED | OUTPATIENT
Start: 2024-08-01 | End: 2025-01-28

## 2024-08-01 RX ORDER — FLUOXETINE HYDROCHLORIDE 40 MG/1
40 CAPSULE ORAL DAILY
Qty: 30 CAPSULE | Refills: 0 | Status: SHIPPED | OUTPATIENT
Start: 2024-08-01 | End: 2024-08-31

## 2024-08-01 RX ORDER — GABAPENTIN 300 MG/1
600 CAPSULE ORAL 3 TIMES DAILY
Qty: 540 CAPSULE | Refills: 0 | Status: SHIPPED | OUTPATIENT
Start: 2024-08-01 | End: 2024-10-30

## 2024-08-01 RX ORDER — FLUOXETINE HYDROCHLORIDE 20 MG/1
20 CAPSULE ORAL DAILY
Qty: 90 CAPSULE | Refills: 3 | Status: SHIPPED | OUTPATIENT
Start: 2024-08-01 | End: 2025-07-27

## 2024-08-01 RX ORDER — FLUOXETINE HYDROCHLORIDE 40 MG/1
40 CAPSULE ORAL DAILY
Qty: 90 CAPSULE | Refills: 3 | Status: SHIPPED | OUTPATIENT
Start: 2024-08-01 | End: 2025-07-27

## 2024-08-01 RX ORDER — AMITRIPTYLINE HYDROCHLORIDE 50 MG/1
50 TABLET, FILM COATED ORAL NIGHTLY
Qty: 21 TABLET | Refills: 0 | Status: SHIPPED | OUTPATIENT
Start: 2024-08-01 | End: 2024-08-15

## 2024-08-01 RX ORDER — FLUOXETINE HYDROCHLORIDE 20 MG/1
20 CAPSULE ORAL DAILY
Qty: 30 CAPSULE | Refills: 0 | Status: SHIPPED | OUTPATIENT
Start: 2024-08-01 | End: 2024-08-31

## 2024-08-01 RX ORDER — PRAZOSIN HYDROCHLORIDE 1 MG/1
1 CAPSULE ORAL NIGHTLY
Qty: 90 CAPSULE | Refills: 1 | Status: SHIPPED | OUTPATIENT
Start: 2024-08-01 | End: 2025-01-28

## 2024-08-01 ASSESSMENT — PATIENT HEALTH QUESTIONNAIRE - PHQ9
SUM OF ALL RESPONSES TO PHQ QUESTIONS 1-9: 0
1. LITTLE INTEREST OR PLEASURE IN DOING THINGS: NOT AT ALL
2. FEELING DOWN, DEPRESSED OR HOPELESS: NOT AT ALL
SUM OF ALL RESPONSES TO PHQ QUESTIONS 1-9: 0
SUM OF ALL RESPONSES TO PHQ9 QUESTIONS 1 & 2: 0
SUM OF ALL RESPONSES TO PHQ QUESTIONS 1-9: 0
SUM OF ALL RESPONSES TO PHQ QUESTIONS 1-9: 0

## 2024-08-01 NOTE — PROGRESS NOTES
discussed with patient, who verbalizes their understanding.     [facility]     Disposition:  home    Follow Up:  Return in 2 months , or call in the interim for any side-effects, decompensation, questions, or problems between now and the next visit.     Return in about 2 months (around 9/30/2024) for Follow up.      50 minutes face to face with the patient with more than 50% of the total time spent on education, counseling, & coordination of care of the patient regarding ongoing psychiatric illness.      Lucero Kwok, TRA - CNP   [facility]

## 2024-08-07 ENCOUNTER — APPOINTMENT (OUTPATIENT)
Dept: PHYSICAL THERAPY | Age: 34
End: 2024-08-07

## 2024-08-08 ENCOUNTER — HOSPITAL ENCOUNTER (OUTPATIENT)
Dept: PHYSICAL THERAPY | Age: 34
Setting detail: RECURRING SERIES
Discharge: HOME | End: 2024-08-11

## 2024-08-08 DIAGNOSIS — M54.59 OTHER LOW BACK PAIN: ICD-10-CM

## 2024-08-08 DIAGNOSIS — G89.29 CHRONIC LEFT SHOULDER PAIN: ICD-10-CM

## 2024-08-08 DIAGNOSIS — M25.511 CHRONIC RIGHT SHOULDER PAIN: ICD-10-CM

## 2024-08-08 DIAGNOSIS — M54.2 NECK PAIN: ICD-10-CM

## 2024-08-08 DIAGNOSIS — M25.512 CHRONIC LEFT SHOULDER PAIN: ICD-10-CM

## 2024-08-08 DIAGNOSIS — R26.2 DIFFICULTY IN WALKING, NOT ELSEWHERE CLASSIFIED: ICD-10-CM

## 2024-08-08 DIAGNOSIS — G89.29 CHRONIC RIGHT SHOULDER PAIN: ICD-10-CM

## 2024-08-08 DIAGNOSIS — M25.50 PAIN IN JOINT INVOLVING MULTIPLE SITES: Primary | ICD-10-CM

## 2024-08-08 PROCEDURE — 97162 PT EVAL MOD COMPLEX 30 MIN: CPT

## 2024-08-08 PROCEDURE — 97110 THERAPEUTIC EXERCISES: CPT

## 2024-08-08 NOTE — PROGRESS NOTES
Tiffanie Tejeda  : 1990  Primary:  (Self-Pay)  Secondary:  [facility] @ [facility] [address] [place] [address]  Phone: [phone]  Fax: [phone]    Plan of Care/Certification Expiration Date: 10/31/24        Plan of Care/Certification Expiration Date:  Plan of Care/Certification Expiration Date: 10/31/24    Frequency/Duration:      Time In/Out:          PT Visit Info:    Total # of Visits to Date: 1      Visit Count:  1    OUTPATIENT PHYSICAL THERAPY:   Treatment Note 2024       Episode  (joint pain multiple sites)               Treatment Diagnosis:    Pain in joint involving multiple sites  Chronic left shoulder pain  Chronic right shoulder pain  Neck pain  Other low back pain  Difficulty in walking, not elsewhere classified  Medical/Referring Diagnosis:    Fibromyalgia  Lupus (HCC)      Referring Physician:  Chencho Jackson MD MD Orders:  PT Eval and Treat   Return MD Appt:  prn   Date of Onset:  Onset Date: 22     Allergies:   Patient has no known allergies.  Restrictions/Precautions:   None      Interventions Planned (Treatment may consist of any combination of the following):     See Assessment Note    Subjective Comments:   See eval  Initial Pain Level::   3   /10  Post Session Pain Level:      3  /10  Medications Last Reviewed:  2024  Updated Objective Findings:  See Evaluation Note from today  Treatment   THERAPEUTIC EXERCISE: (15 minutes):    Exercises per grid below to improve mobility.  Required minimal visual and verbal cues to promote proper body alignment and promote proper body posture.  Progressed range and repetitions as indicated.   Date:  24 Date:   Date:     Activity/Exercise Parameters Parameters Parameters   Cervical retractions 1 x 10     education Sitting with lumbar roll, avoid forward head posture                                       Treatment/Session Summary:    Treatment Assessment:   Pt had improved shoulder

## 2024-08-08 NOTE — THERAPY EVALUATION
Tiffanie Tejeda  : 1990  Primary:  (Self-Pay)  Secondary:  [facility] [place] [facility] [address]  Phone: [phone]  Fax: [phone]    Plan of Care/Certification Expiration Date: 10/31/24        Plan of Care/Certification Expiration Date:  Plan of Care/Certification Expiration Date: 10/31/24    Frequency/Duration:      Time In/Out:          PT Visit Info:    Total # of Visits to Date: 1      Visit Count:  1                OUTPATIENT PHYSICAL THERAPY:             Initial Assessment 2024               Episode (joint pain multiple sites)         Treatment Diagnosis:     Pain in joint involving multiple sites  Chronic left shoulder pain  Chronic right shoulder pain  Neck pain  Other low back pain  Difficulty in walking, not elsewhere classified  Medical/Referring Diagnosis:    Fibromyalgia  Lupus (HCC)      Referring Physician:  Chencho Jackson MD MD Orders:  PT Eval and Treat   Return MD Appt:  prn  Date of Onset:  Onset Date: 22     Allergies:  Patient has no known allergies.  Restrictions/Precautions:    None      Medications Last Reviewed:  2024     SUBJECTIVE   History of Injury/Illness (Reason for Referral):  Pt presents with diagnosis of lupus and fibromyalgia in December. Pt states main problems are pain and weakness mostly in LE, shoulders and neck.  Pt also has injuries to dorsum sides of hands and has hand pain. She is here to begin aquatic exercise to get as functional as possible. Pt states today most of the pain is in her shoulders and neck. She states she is stiff from pelvis to LE.  Patient Stated Goal(s):  \"learn healthier ways to be active along side chronic pain, exercise and challenge my body\"  Initial Pain Level:      3 /10 more stiffness  3-9/10 range   Post Session Pain Level:   3   /10  Past Medical History/Comorbidities:   Ms. Tejeda  has a past medical history of Abnormal Pap smear of cervix, Addiction to

## 2024-08-13 ENCOUNTER — HOSPITAL ENCOUNTER (OUTPATIENT)
Dept: PHYSICAL THERAPY | Age: 34
Setting detail: RECURRING SERIES
End: 2024-08-13

## 2024-08-15 ENCOUNTER — HOSPITAL ENCOUNTER (OUTPATIENT)
Dept: PHYSICAL THERAPY | Age: 34
Setting detail: RECURRING SERIES
Discharge: HOME | End: 2024-08-18

## 2024-08-15 PROCEDURE — 97113 AQUATIC THERAPY/EXERCISES: CPT

## 2024-08-15 ASSESSMENT — PAIN SCALES - GENERAL: PAINLEVEL_OUTOF10: 7

## 2024-08-15 NOTE — PROGRESS NOTES
cervical/thoracic posture x 20     Bicep / tricep X 30 B         Treatment/Session Summary:    Treatment Assessment:  Patient felt significant relief of pain in the water today and had improved quality of movement and decreased guarding postures throughout her session today.  Felt \"good challenge\" with hip abduction, hamstring curl and calf raises.    Communication/Consultation:  None today  Equipment provided today:  HEP  Recommendations/Intent for next treatment session: Next visit will focus on aquatics and continue with HEP when seen on land.    >Total Treatment Billable Duration:  50 minutes   Time In: 1350  Time Out: 1440    Maria Victoria Walters PTA         Charge Capture  Events  [employer] Portal  Appt Desk  Attendance Report     Future Appointments   Date Time Provider Department Center   8/19/2024  1:45 PM Maria Victoria Walters PTA [facility]   8/22/2024  1:45 PM Maria Victoria Walters PTA [facility]   8/27/2024  1:45 PM Oksana Lauren, PTA [facility]   8/28/2024  3:15 PM Rozina Bradshaw, PTA [facility]   9/3/2024  9:45 AM Anita Garber, PT [facility]   9/4/2024  4:00 PM Rozina Bradshaw, PTA [facility]   9/9/2024  2:30 PM Rozina Bradshaw, PTA [facility]   9/11/2024  2:30 PM Rozina Bradshaw, PTA [facility]   10/1/2024  9:00 AM Lucero Kwok APRN - CNP [facility]   10/22/2024 10:00 AM Lisa Calhoun MD [facility]   12/11/2024  3:45 PM Norma Ritchie MD [facility]   2/3/2025  9:45 AM Milan Crawford MD [facility]

## 2024-08-16 ENCOUNTER — OFFICE VISIT (OUTPATIENT)
Dept: FAMILY MEDICINE CLINIC | Facility: CLINIC | Age: 34
End: 2024-08-16

## 2024-08-16 VITALS
WEIGHT: 179 LBS | TEMPERATURE: 98.5 F | BODY MASS INDEX: 35.14 KG/M2 | HEIGHT: 60 IN | HEART RATE: 112 BPM | DIASTOLIC BLOOD PRESSURE: 68 MMHG | OXYGEN SATURATION: 96 % | SYSTOLIC BLOOD PRESSURE: 116 MMHG

## 2024-08-16 DIAGNOSIS — M79.7 FIBROMYALGIA: Primary | ICD-10-CM

## 2024-08-16 PROCEDURE — 99214 OFFICE O/P EST MOD 30 MIN: CPT | Performed by: FAMILY MEDICINE

## 2024-08-16 RX ORDER — DULOXETIN HYDROCHLORIDE 30 MG/1
CAPSULE, DELAYED RELEASE ORAL
Qty: 30 CAPSULE | Refills: 0 | Status: SHIPPED | OUTPATIENT
Start: 2024-08-16

## 2024-08-16 NOTE — PATIENT INSTRUCTIONS
-Recommend taking duloxetine 30 mg for 2 weeks  -while taking duloxetine only take fluoxetine 40 mg daily

## 2024-08-16 NOTE — PROGRESS NOTES
Tiffanie Tejeda is a 34 y.o. female who presents today for the following:  Chief Complaint   Patient presents with    Pain     Radiating, burning pain all over body since Sunday (started in feet & moved its way up)        No Known Allergies    Current Outpatient Medications   Medication Sig Dispense Refill    amitriptyline (ELAVIL) 50 MG tablet Take 1 tablet by mouth nightly for 14 days Then take 1/2 tablet nightly for 14 days 21 tablet 0    gabapentin (NEURONTIN) 300 MG capsule Take 2 capsules by mouth 3 times daily for 90 days. 540 capsule 0    FLUoxetine (PROZAC) 20 MG capsule Take 1 capsule by mouth daily 30 capsule 0    FLUoxetine (PROZAC) 20 MG capsule Take 1 capsule by mouth daily 90 capsule 3    FLUoxetine (PROZAC) 40 MG capsule Take 1 capsule by mouth daily 30 capsule 0    FLUoxetine (PROZAC) 40 MG capsule Take 1 capsule by mouth daily 90 capsule 3    prazosin (MINIPRESS) 5 MG capsule Take 1 capsule by mouth nightly 90 capsule 1    prazosin (MINIPRESS) 1 MG capsule Take 1 capsule by mouth nightly Take with the 5 mg capsule to equal 6 mg nightly 90 capsule 1    naloxone 4 MG/0.1ML LIQD nasal spray 1 spray by Nasal route as needed for Opioid Reversal (Patient not taking: Reported on 7/23/2024)      cyclobenzaprine (FLEXERIL) 10 MG tablet Take 1 tablet by mouth nightly as needed for Muscle spasms 90 tablet 3    Cholecalciferol (VITAMIN D3) 50 MCG (2000 UT) CAPS Take 1 capsule by mouth daily 90 capsule 3    Multiple Vitamin (MULTIVITAMIN ADULT PO) Take by mouth      hydroxychloroquine (PLAQUENIL) 200 MG tablet Take 1 pill twice a day after food. 60 tablet 3    mineral oil-hydrophilic petrolatum (AQUAPHOR) ointment Apply topically to irritated area 2-3 times a day as needed for irritation (Patient not taking: Reported on 7/23/2024) 50 g 3    triamcinolone (KENALOG) 0.1 % cream Apply topically 2 times daily. (Patient not taking: Reported on 7/23/2024) 30 g 2    omeprazole (PRILOSEC) 20 MG delayed release

## 2024-08-19 ENCOUNTER — HOSPITAL ENCOUNTER (OUTPATIENT)
Dept: PHYSICAL THERAPY | Age: 34
Setting detail: RECURRING SERIES
Discharge: HOME | End: 2024-08-22

## 2024-08-19 PROCEDURE — 97113 AQUATIC THERAPY/EXERCISES: CPT

## 2024-08-19 ASSESSMENT — PAIN SCALES - GENERAL: PAINLEVEL_OUTOF10: 5

## 2024-08-19 NOTE — PROGRESS NOTES
Tiffanie NABIL Tejeda  : 1990  Primary:  (Self-Pay)  Secondary:  [facility] @ [facility] [address] [place] [address]  Phone: [phone]  Fax: [phone]    Plan of Care/Certification Expiration Date: 10/31/24        Plan of Care/Certification Expiration Date:  Plan of Care/Certification Expiration Date: 10/31/24    Frequency/Duration:      Time In/Out:   Time In: 1355  Time Out: 1455      PT Visit Info:    Total # of Visits to Date: 3      Visit Count:  3    OUTPATIENT PHYSICAL THERAPY:   Treatment Note 2024       Episode  (joint pain multiple sites)               Treatment Diagnosis:    No data found  Medical/Referring Diagnosis:    Fibromyalgia  Lupus (HCC)      Referring Physician:  Chencho Jackson MD MD Orders:  PT Eval and Treat   Return MD Appt:  prn   Date of Onset:  Onset Date: 22     Allergies:   Patient has no known allergies.  Restrictions/Precautions:   None      Interventions Planned (Treatment may consist of any combination of the following):     See Assessment Note    Subjective Comments: Patient feels some of her med changes are affecting some of her symptoms, her body feels restless, \"electric\".  She reports she felt pretty good after her last pool session.  She reports she rested better that night.      Initial Pain Level::     5/10  Post Session Pain Level:        3/10  Medications Last Reviewed:  2024  Updated Objective Findings:  None Today  Treatment   Aquatic Therapy: (45 minutes)   Date:  8/15/24 Date:  24 Date:     Activity/Exercise Parameters Parameters Parameters   Walking forward 4 lengths 4 lengths    Walking backward 4 lengths 4 lengths    Side stepping 4 widths 4 widths    High knee march X 20 with core cueing X 2 minutes with minimal core cueing    Hip flexion/extension      Hip abduction X 20 each B with cueing X 30 each, pelvic posture cueing    Hamstring curl X 20 each X 30 each    Heel raises X 20B X 20 BLE

## 2024-08-22 ENCOUNTER — HOSPITAL ENCOUNTER (OUTPATIENT)
Dept: PHYSICAL THERAPY | Age: 34
Setting detail: RECURRING SERIES
End: 2024-08-22

## 2024-08-27 ENCOUNTER — APPOINTMENT (OUTPATIENT)
Dept: PHYSICAL THERAPY | Age: 34
End: 2024-08-27

## 2024-08-28 ENCOUNTER — HOSPITAL ENCOUNTER (OUTPATIENT)
Dept: PHYSICAL THERAPY | Age: 34
Setting detail: RECURRING SERIES
Discharge: HOME | End: 2024-08-31

## 2024-08-28 PROCEDURE — 97113 AQUATIC THERAPY/EXERCISES: CPT

## 2024-08-28 NOTE — PROGRESS NOTES
Tiffanie Tejeda  : 1990  Primary:  (Self-Pay)  Secondary:  [facility] [address] [place] [address]  Phone: [phone]  Fax: [phone]    Plan of Care/Certification Expiration Date: 10/31/24        Plan of Care/Certification Expiration Date:  Plan of Care/Certification Expiration Date: 10/31/24    Frequency/Duration:      Time In/Out:   Time In: 1510  Time Out: 1605      PT Visit Info:    Total # of Visits to Date: 5      Visit Count:  4    OUTPATIENT PHYSICAL THERAPY:   Treatment Note 2024       Episode  (joint pain multiple sites)               Treatment Diagnosis:    No data found  Medical/Referring Diagnosis:    Fibromyalgia  Lupus (HCC)      Referring Physician:  Chencho Jackson MD MD Orders:  PT Eval and Treat   Return MD Appt:  prn   Date of Onset:  Onset Date: 22     Allergies:   Patient has no known allergies.  Restrictions/Precautions:   None      Interventions Planned (Treatment may consist of any combination of the following):     See Assessment Note    Subjective Comments: Pt states she feels the water has really been helping her.      Initial Pain Level::    no VAS  /10  Post Session Pain Level:       no VAS 3/10  Medications Last Reviewed:  2024  Updated Objective Findings:  None Today  Treatment   Aquatic Therapy: (45 minutes)   Date:  24 Date:  24   Activity/Exercise Parameters Parameters   Walking forward 4 lengths  2 lengths   Walking backward 4 lengths 2 lengths   Goose step  2 lengths   Long step  2 lengths   Heel/toe walks  2 lengths   Side stepping 4 widths 2 lengths   High knee march X 2 minutes with minimal core cueing 2 lengths   Hip flexion  Small noodle for resistance x10 B   Hip abduction X 30 each, pelvic posture cueing Small noodle for resistance x10 B   Marching   Small noodle for resistance x10 B   Hip circles   Small noodle for resistance x10 B each direction    Hamstring curl X 30 each     Heel raises X 20 BLE    minisquats     Calf stretches Hold 30 x 2    Hamstring stretches Hold 30 x 2 Small noodle under ankle 30 x3   Hip flexor stretches Hold 30 x 1 B    Step ups     Shoulder sweep forward and back X 20 each with and without pool foam dumbbells    Bicep / tricep     Shoulder abduction/adduction X20 each with and without pool foam dumbbells X10 with dumbbells    Pushup position shoulder abd/add   X10 with dumbbells    Standing hip up/out/in/down alternating focusing on core stability            Treatment/Session Summary:    Treatment Assessment:  Pt did well with added exercises and resistance today.    Communication/Consultation:  None today  Equipment provided today:  HEP  Recommendations/Intent for next treatment session: Next visit will focus on aquatics and continue with HEP when seen on land.    >Total Treatment Billable Duration:  55 minutes   Time In: 1510  Time Out: 1605    Rozina Bradshaw PTA         Charge Capture  Events  [employer] Portal  Appt Desk  Attendance Report     Future Appointments   Date Time Provider Department Center   9/3/2024  9:45 AM Anita Garber PT [facility]   9/4/2024  4:00 PM Rozina Bradshaw PTA [facility]   9/9/2024  2:30 PM oRzina Bradshaw PTA [facility]   10/1/2024  9:00 AM Lucero Kwok APRN - CNP [facility]   10/22/2024 10:00 AM Lisa Calhoun MD [facility] AM[facility]   12/11/2024  3:45 PM Norma Ritchie MD [facility] AMB   2/3/2025  9:45 AM Milan Crawford MD [facility] AM[facility]

## 2024-09-02 ENCOUNTER — PATIENT MESSAGE (OUTPATIENT)
Dept: RHEUMATOLOGY | Age: 34
End: 2024-09-02

## 2024-09-02 DIAGNOSIS — M32.9 LUPUS (HCC): ICD-10-CM

## 2024-09-03 ENCOUNTER — HOSPITAL ENCOUNTER (OUTPATIENT)
Dept: PHYSICAL THERAPY | Age: 34
Setting detail: RECURRING SERIES
Discharge: HOME | End: 2024-09-06

## 2024-09-03 ENCOUNTER — TELEPHONE (OUTPATIENT)
Dept: ENT CLINIC | Age: 34
End: 2024-09-03

## 2024-09-03 PROCEDURE — 97110 THERAPEUTIC EXERCISES: CPT

## 2024-09-03 RX ORDER — HYDROXYCHLOROQUINE SULFATE 200 MG/1
TABLET, FILM COATED ORAL
Qty: 60 TABLET | Refills: 1 | Status: SHIPPED | OUTPATIENT
Start: 2024-09-03

## 2024-09-03 RX ORDER — FAMOTIDINE 40 MG/1
40 TABLET, FILM COATED ORAL EVERY EVENING
Qty: 90 TABLET | Refills: 5 | Status: SHIPPED | OUTPATIENT
Start: 2024-09-03

## 2024-09-03 NOTE — TELEPHONE ENCOUNTER
Patient requesting rx for hydroxychloroquine to go to a different pharmacy ([facility]). Rx was last written on 6/18/24 at last OV. Next OV 10/22/24. Labs 6/18/24.   Rx pended to go to [facility] in case you want to approve fill.

## 2024-09-03 NOTE — THERAPY EVALUATION
Tiffanietangela Tejeda  : 1990  Primary:  (Self-Pay)  Secondary:  [facility] @ [facility] [address] [place] [address]  Phone: [phone]  Fax: [phone]    Plan of Care/Certification Expiration Date: 10/31/24        Plan of Care/Certification Expiration Date:  Plan of Care/Certification Expiration Date: 10/31/24    Frequency/Duration:      Time In/Out:          PT Visit Info:    Total # of Visits to Date: 5      Visit Count:  5                OUTPATIENT PHYSICAL THERAPY:             Progress Report 9/3/2024               Episode (joint pain multiple sites)         Treatment Diagnosis:     No data found  Medical/Referring Diagnosis:    Fibromyalgia  Lupus (HCC)      Referring Physician:  Chencho Jackson MD MD Orders:  PT Eval and Treat   Return MD Appt:  prn  Date of Onset:  Onset Date: 22     Allergies:  Patient has no known allergies.  Restrictions/Precautions:    None      Medications Last Reviewed:  9/3/2024     SUBJECTIVE   History of Injury/Illness (Reason for Referral):  Pt presents with diagnosis of lupus and fibromyalgia in December. Pt states main problems are pain and weakness mostly in LE, shoulders and neck.  Pt also has injuries to dorsum sides of hands and has hand pain. She is here to begin aquatic exercise to get as functional as possible. Pt states today most of the pain is in her shoulders and neck. She states she is stiff from pelvis to LE.  Current situation- Pt states the pool is helping a good deal. She states she is having less pain in general and is learning about the different muscles while she is doing the aquatics. She is performing ex at home.  Patient Stated Goal(s):  \"learn healthier ways to be active along side chronic pain, exercise and challenge my body\"  Initial Pain Level:      1-2 /10 more stiffness  2-6/10 range   Post Session Pain Level:   1-2   /10     OBJECTIVE   ROM- neck- flex- WNL                       Ext- 50%

## 2024-09-03 NOTE — PROGRESS NOTES
Tiffanie Tejeda  : 1990  Primary:  (Self-Pay)  Secondary:  [facility] @ [facility] [address] [place] [address]  Phone: [phone]  Fax: [phone]    Plan of Care/Certification Expiration Date: 10/31/24        Plan of Care/Certification Expiration Date:  Plan of Care/Certification Expiration Date: 10/31/24    Frequency/Duration:      Time In/Out:          PT Visit Info:    Total # of Visits to Date: 5      Visit Count:  5    OUTPATIENT PHYSICAL THERAPY:   Treatment Note 9/3/2024       Episode  (joint pain multiple sites)               Treatment Diagnosis:    No data found  Medical/Referring Diagnosis:    Fibromyalgia  Lupus (HCC)      Referring Physician:  Chencho Jackson MD MD Orders:  PT Eval and Treat   Return MD Appt:  prn   Date of Onset:  Onset Date: 22     Allergies:   Patient has no known allergies.  Restrictions/Precautions:   None      Interventions Planned (Treatment may consist of any combination of the following):     See Assessment Note    Subjective Comments:   See progress note from today  Initial Pain Level::   2   /10  Post Session Pain Level:      2  /10  Medications Last Reviewed:  9/3/2024  Updated Objective Findings:  None Today  Treatment   THERAPEUTIC EXERCISE: (45 minutes):    Exercises per grid below to improve mobility.  Required minimal visual and verbal cues to promote proper body alignment and promote proper body posture.  Progressed range and repetitions as indicated.   Date:  24 Date:  9/3/24 Date:     Activity/Exercise Parameters Parameters Parameters   Cervical retractions 1 x 10     education Sitting with lumbar roll, avoid forward head posture     Quadruped position  Hand rolls to decrease wrist extension,  Shoulders away from ears,  Belly button to spine    Cat /cow  Technique x7    Lumbar rotation in hooklying  5 each side    Single knee to chest  5 x 10 sec    Shoulder shrugs  Shoulder rolls  10  10

## 2024-09-04 ENCOUNTER — HOSPITAL ENCOUNTER (OUTPATIENT)
Dept: PHYSICAL THERAPY | Age: 34
Setting detail: RECURRING SERIES
Discharge: HOME | End: 2024-09-07

## 2024-09-04 PROCEDURE — 97113 AQUATIC THERAPY/EXERCISES: CPT

## 2024-09-04 NOTE — PROGRESS NOTES
Tiffanie Tejeda  : 1990  Primary:  (Self-Pay)  Secondary:  [facility] @ [facility] [address] [place] [address]  Phone: [phone]  Fax: [phone]    Plan of Care/Certification Expiration Date: 10/31/24        Plan of Care/Certification Expiration Date:  Plan of Care/Certification Expiration Date: 10/31/24    Frequency/Duration:      Time In/Out:   Time In: 1600  Time Out: 1700      PT Visit Info:    Total # of Visits to Date: 6      Visit Count:  6    OUTPATIENT PHYSICAL THERAPY:   Treatment Note 2024       Episode  (joint pain multiple sites)               Treatment Diagnosis:    No data found  Medical/Referring Diagnosis:    Fibromyalgia  Lupus (HCC)      Referring Physician:  Chencho Jackson MD MD Orders:  PT Eval and Treat   Return MD Appt:  prn   Date of Onset:  Onset Date: 22     Allergies:   Patient has no known allergies.  Restrictions/Precautions:   None      Interventions Planned (Treatment may consist of any combination of the following):     See Assessment Note    Subjective Comments:  Pt states she has been looking forward to getting back into the water.     Initial Pain Level::    no VAS  /10  Post Session Pain Level:       no VAS 3/10  Medications Last Reviewed:  2024  Updated Objective Findings:  None Today  Treatment   Aquatic Therapy: (45 minutes)   Date:  24 Date  24   Activity/Exercise Parameters    Walking forward  2 lengths 4 lengths    Walking backward 2 lengths 4 lengths   Goose step 2 lengths 4 lengths   Long step 2 lengths 4 lengths   Heel/toe walks 2 lengths 4 lengths   Side stepping 2 lengths 4 lengths   High knee march 2 lengths 4 lengths   Hip flexion Small noodle for resistance x10 B Small noodle for resistance x10 B   Hip abduction Small noodle for resistance x10 B Small noodle for resistance x10 B   Marching  Small noodle for resistance x10 B Small noodle for resistance x10 B   Hip circles  Small noodle

## 2024-09-06 ENCOUNTER — PATIENT MESSAGE (OUTPATIENT)
Dept: FAMILY MEDICINE CLINIC | Facility: CLINIC | Age: 34
End: 2024-09-06

## 2024-09-06 DIAGNOSIS — M79.641 BILATERAL HAND PAIN: Primary | ICD-10-CM

## 2024-09-06 DIAGNOSIS — M32.9 LUPUS (HCC): ICD-10-CM

## 2024-09-06 DIAGNOSIS — M79.642 BILATERAL HAND PAIN: Primary | ICD-10-CM

## 2024-09-06 DIAGNOSIS — M79.7 FIBROMYALGIA: ICD-10-CM

## 2024-09-09 ENCOUNTER — HOSPITAL ENCOUNTER (OUTPATIENT)
Dept: PHYSICAL THERAPY | Age: 34
Setting detail: RECURRING SERIES
Discharge: HOME | End: 2024-09-12

## 2024-09-09 PROCEDURE — 97113 AQUATIC THERAPY/EXERCISES: CPT

## 2024-09-11 ENCOUNTER — HOSPITAL ENCOUNTER (OUTPATIENT)
Dept: PHYSICAL THERAPY | Age: 34
Setting detail: RECURRING SERIES
Discharge: HOME | End: 2024-09-14

## 2024-09-11 ENCOUNTER — APPOINTMENT (OUTPATIENT)
Dept: PHYSICAL THERAPY | Age: 34
End: 2024-09-11

## 2024-09-11 PROCEDURE — 97113 AQUATIC THERAPY/EXERCISES: CPT

## 2024-09-16 ENCOUNTER — HOSPITAL ENCOUNTER (OUTPATIENT)
Dept: PHYSICAL THERAPY | Age: 34
Setting detail: RECURRING SERIES
Discharge: HOME | End: 2024-09-19

## 2024-09-16 PROCEDURE — 97113 AQUATIC THERAPY/EXERCISES: CPT

## 2024-09-18 ENCOUNTER — APPOINTMENT (OUTPATIENT)
Dept: PHYSICAL THERAPY | Age: 34
End: 2024-09-18

## 2024-09-23 ENCOUNTER — HOSPITAL ENCOUNTER (OUTPATIENT)
Dept: PHYSICAL THERAPY | Age: 34
Setting detail: RECURRING SERIES
Discharge: HOME | End: 2024-09-26

## 2024-09-23 PROCEDURE — 97113 AQUATIC THERAPY/EXERCISES: CPT

## 2024-09-25 ENCOUNTER — HOSPITAL ENCOUNTER (OUTPATIENT)
Dept: PHYSICAL THERAPY | Age: 34
Setting detail: RECURRING SERIES
Discharge: HOME | End: 2024-09-28

## 2024-09-25 PROCEDURE — 97113 AQUATIC THERAPY/EXERCISES: CPT

## 2024-10-03 ENCOUNTER — APPOINTMENT (OUTPATIENT)
Dept: PHYSICAL THERAPY | Age: 34
End: 2024-10-03

## 2024-10-08 ENCOUNTER — OFFICE VISIT (OUTPATIENT)
Dept: BEHAVIORAL/MENTAL HEALTH CLINIC | Age: 34
End: 2024-10-08

## 2024-10-08 VITALS
WEIGHT: 175.2 LBS | DIASTOLIC BLOOD PRESSURE: 76 MMHG | OXYGEN SATURATION: 97 % | BODY MASS INDEX: 34.22 KG/M2 | HEART RATE: 94 BPM | SYSTOLIC BLOOD PRESSURE: 122 MMHG

## 2024-10-08 DIAGNOSIS — M79.642 BILATERAL HAND PAIN: ICD-10-CM

## 2024-10-08 DIAGNOSIS — M79.641 BILATERAL HAND PAIN: ICD-10-CM

## 2024-10-08 DIAGNOSIS — F41.9 ANXIETY: Primary | ICD-10-CM

## 2024-10-08 DIAGNOSIS — F51.5 NIGHTMARES ASSOCIATED WITH CHRONIC POST-TRAUMATIC STRESS DISORDER: ICD-10-CM

## 2024-10-08 DIAGNOSIS — F43.12 NIGHTMARES ASSOCIATED WITH CHRONIC POST-TRAUMATIC STRESS DISORDER: ICD-10-CM

## 2024-10-08 DIAGNOSIS — F51.04 PSYCHOPHYSIOLOGICAL INSOMNIA: ICD-10-CM

## 2024-10-08 DIAGNOSIS — F43.10 PTSD (POST-TRAUMATIC STRESS DISORDER): ICD-10-CM

## 2024-10-08 PROCEDURE — 99215 OFFICE O/P EST HI 40 MIN: CPT | Performed by: NURSE PRACTITIONER

## 2024-10-08 RX ORDER — FLUOXETINE 40 MG/1
40 CAPSULE ORAL DAILY
Qty: 90 CAPSULE | Refills: 1 | Status: SHIPPED | OUTPATIENT
Start: 2024-10-08 | End: 2025-04-06

## 2024-10-08 RX ORDER — GABAPENTIN 300 MG/1
CAPSULE ORAL
Qty: 630 CAPSULE | Refills: 1 | Status: SHIPPED | OUTPATIENT
Start: 2024-10-08 | End: 2024-10-11

## 2024-10-08 RX ORDER — PRAZOSIN HYDROCHLORIDE 5 MG/1
5 CAPSULE ORAL NIGHTLY
Qty: 90 CAPSULE | Refills: 1 | Status: SHIPPED | OUTPATIENT
Start: 2024-10-08 | End: 2025-04-06

## 2024-10-08 RX ORDER — PRAZOSIN HYDROCHLORIDE 1 MG/1
1 CAPSULE ORAL NIGHTLY
Qty: 90 CAPSULE | Refills: 1 | Status: SHIPPED | OUTPATIENT
Start: 2024-10-08 | End: 2025-04-06

## 2024-10-08 ASSESSMENT — PATIENT HEALTH QUESTIONNAIRE - PHQ9
SUM OF ALL RESPONSES TO PHQ QUESTIONS 1-9: 0
2. FEELING DOWN, DEPRESSED OR HOPELESS: NOT AT ALL
1. LITTLE INTEREST OR PLEASURE IN DOING THINGS: NOT AT ALL
SUM OF ALL RESPONSES TO PHQ QUESTIONS 1-9: 0
SUM OF ALL RESPONSES TO PHQ9 QUESTIONS 1 & 2: 0
SUM OF ALL RESPONSES TO PHQ QUESTIONS 1-9: 0
SUM OF ALL RESPONSES TO PHQ QUESTIONS 1-9: 0

## 2024-10-08 NOTE — PROGRESS NOTES
OUTPATIENT PSYCHIATRIC RETURN VISIT PROGRESS NOTE      --Lucero Kwok, APRN - CNP on 10/8/2024 at 10:02 AM    An electronic signature was used to authenticate this note.   Date of Service: 10/8/2024     Identification    Tiffanie Tejeda is a 34 y.o.  with a past psychiatric history of anxiety, bipolar disorder, PTSD, insomnia, nightmares  who presents today for a psychiatric follow up appointment.      CC:  Routine medication management follow up.  No chief complaint on file.       Subjective / Interval History:    Pt comes to clinic today and reports that mood is stabilizing on current medication regimen and patient is tolerating current medications with no adverse effects.She is no longer taking the Plaquenil or duloxetine that was prescribed by rheumatology and has experienced no worsening of symptoms since stopping. Continues to have nighttime awakenings often from pain. Has been referred to pain management.   Has found aquatic therapy very helpful for fibromyalgia symptoms and attends regularly. Fluoxetine effective for anxiety. Prazosin effective for nightmares.     . Pt has been medication compliant and denies any side-effects. Pt denies SI, HI and AVH. Does not endorse any manic or psychotic symptoms.    Psychiatric Review Of Systems:  Sleep: generally restful sleep  Appetite: ok  Current suicidal/homicidal ideations: Denies SI/ HI  Current auditory/visual hallucinations: Denies     Medications:    Current Outpatient Medications:     famotidine (PEPCID) 40 MG tablet, Take 1 tablet by mouth every evening, Disp: 90 tablet, Rfl: 5    hydroxychloroquine (PLAQUENIL) 200 MG tablet, Take 1 pill twice a day after food., Disp: 60 tablet, Rfl: 1    DULoxetine (CYMBALTA) 30 MG extended release capsule, Take one tab PO daily, Disp: 30 capsule, Rfl: 0    gabapentin (NEURONTIN) 300 MG capsule, Take 2 capsules by mouth 3 times daily for 90 days., Disp: 540 capsule, Rfl: 0    FLUoxetine (PROZAC) 20 MG capsule, Take 1

## 2024-10-11 ENCOUNTER — OFFICE VISIT (OUTPATIENT)
Age: 34
End: 2024-10-11

## 2024-10-11 ENCOUNTER — HOSPITAL ENCOUNTER (OUTPATIENT)
Dept: PHYSICAL THERAPY | Age: 34
Setting detail: RECURRING SERIES
Discharge: HOME | End: 2024-10-14

## 2024-10-11 DIAGNOSIS — M79.7 FIBROMYALGIA: Primary | ICD-10-CM

## 2024-10-11 PROCEDURE — 97110 THERAPEUTIC EXERCISES: CPT

## 2024-10-11 PROCEDURE — 99204 OFFICE O/P NEW MOD 45 MIN: CPT | Performed by: PHYSICIAN ASSISTANT

## 2024-10-11 RX ORDER — PREGABALIN 100 MG/1
100 CAPSULE ORAL 3 TIMES DAILY
Qty: 90 CAPSULE | Refills: 0 | Status: SHIPPED | OUTPATIENT
Start: 2024-10-11 | End: 2024-11-10

## 2024-10-11 ASSESSMENT — ENCOUNTER SYMPTOMS
EYES NEGATIVE: 1
SHORTNESS OF BREATH: 0
ABDOMINAL PAIN: 0
ALLERGIC/IMMUNOLOGIC NEGATIVE: 1

## 2024-10-11 NOTE — PROGRESS NOTES
Erroneous nurse note  
Ms Tejeda is a new patient referred to us by her primary care for chronic widespread pain. She is currently seeing Dr Calhoun but has not been particularly happy with treatment thus far. She is currently on hospital sponsorship for her treatment. She has been actively participating in aquatic physical therapy for her FMS related symptoms, having completed 11 visits.   
for chills, fatigue, fever and unexpected weight change.   HENT:  Negative for congestion and ear pain.    Eyes: Negative.    Respiratory:  Negative for shortness of breath.    Cardiovascular:  Negative for chest pain.   Gastrointestinal:  Negative for abdominal pain.   Endocrine: Negative.    Genitourinary: Negative.    Skin:  Negative for rash.   Allergic/Immunologic: Negative.    Neurological:  Negative for dizziness.   Hematological: Negative.    Psychiatric/Behavioral: Negative.             [place] database for [place] substance prescription monitoring was reviewed.    I personally performed the HPI, exam, and assessment/plan, verified the documentation and approve it is updated, accurate, and complete. Parts or the entirety of this document were transcribed utilizing voice recognition software. Transcription errors may be present.     Mally Silva PA-C under supervision of Dr Dru Foley    Date: October 11, 2024  Patient Name: Tiffanie Tejeda  MRN:[id number]  PCP: Chencho Jackson

## 2024-10-11 NOTE — PROGRESS NOTES
Tiffanie Tejeda  : 1990  Primary:  (Self-Pay)  Secondary:  [facility] @ [facility] [place] [address]  Phone: [phone]  Fax: [phone]    Plan of Care/Certification Expiration Date: 25        Plan of Care/Certification Expiration Date:  Plan of Care/Certification Expiration Date: 25    Frequency/Duration:      Time In/Out:   Time In: 1230  Time Out: 0110      PT Visit Info:    Total # of Visits to Date: 12      Visit Count:  12    OUTPATIENT PHYSICAL THERAPY:   Treatment Note 10/11/2024       Episode  (joint pain multiple sites)               Treatment Diagnosis:    No data found  Medical/Referring Diagnosis:    Fibromyalgia  Lupus      Referring Physician:  Chencho Jackson MD MD Orders:  PT Eval and Treat   Return MD Appt:  prn   Date of Onset:  Onset Date: 22     Allergies:   Patient has no known allergies.  Restrictions/Precautions:   None      Interventions Planned (Treatment may consist of any combination of the following):     See Assessment Note    Subjective Comments:   See recert from today  Initial Pain Level::   4   /10 neck shoulders arms hands  Post Session Pain Level:      0  /10  Medications Last Reviewed:  10/11/2024  Updated Objective Findings:  None Today  Treatment   THERAPEUTIC EXERCISE: (40 minutes):    Exercises per grid below to improve mobility.  Required minimal visual and verbal cues to promote proper body alignment and promote proper body posture.  Progressed range and repetitions as indicated.   Date:  24 Date:  9/3/24 Date:  10/11/24   Activity/Exercise Parameters Parameters Parameters   Cervical retractions 1 x 10     education Sitting with lumbar roll, avoid forward head posture     Quadruped position  Hand rolls to decrease wrist extension,  Shoulders away from ears,  Belly button to spine    Cat /cow  Technique x7    Lumbar rotation in hooklying  5 each side    Single knee to chest  5 x 10 sec  
\"learn healthier ways to be active along side chronic pain, exercise and challenge my body\"  Initial Pain Level:     4  /10 shoulders neck and arms  Post Session Pain Level:   0   /10     OBJECTIVE   ROM- neck- flex- WNL                       Ext- 70%                       L Rot- 90%                       R Rot- 90%-   Retraction- to neutral  Strength- Difficult assessing with manual resist.   UE - grossly WNL   LE- Grossly WNL  Pain- improved pain levels but still ranges from2-7/10  Pain in neck , shoulders and Pelvis down all LE  LEFS- improved by 1  Outcome Measure:   Tool Used: Lower Extremity Functional Scale (LEFS)  Score:  Initial: 44/80 Most Recent: 45/80 (Date: 10/11/24-- )   Interpretation of Score: 20 questions each scored on a 5 point scale with 0 representing \"extreme difficulty or unable to perform\" and 4 representing \"no difficulty\".  The lower the score, the greater the functional disability. 80/80 represents no disability.  Minimal detectable change is 9 points.    ASSESSMENT   Assessment     Pt with diagnosis for fibromyalgia and lupus with multiple joint pain and decreased functional mobility.  She has pain in B shoulders, neck, back and pelvis down hips and into B LE.  She is motivated to begin aquatic therapy and is a good candidate for goals listed below and for aquatic therapy due to decrease impact and facilitated movement in the water.  10/11/24- Pt has made initial improvements and is on track to continue to make improvements.  Will recertify for more visits after 10/31  Therapy Problem List: (Impacting functional limitations):    Decreased Strength, Decreased ROM, Decreased Functional Mobility, Decreased [place] with Home Exercise Program, Decreased Posture, and Decreased Activity Tolerance/Endurance*   Therapy Prognosis:   Good     Initial Assessment Complexity:   Moderate Complexity       PLAN   Effective Dates: 10/31/2024 TO Plan of Care/Certification Expiration Date: 01/23/25

## 2024-10-11 NOTE — PATIENT INSTRUCTIONS
Week 1: Take 100mg Pregabalin in morning dose (No Nita), continue midday Gabapentin and nighttime Gabapentin  Week 2: Take 100mg Pregabalin in morning and afternoon, continue nighttime Gabapentin.   Week 3: Take 100mg Pregabalin three times daily, and 1 capsule of Gabapentin at night.   Week 4: Take ONLY Pregabalin.

## 2024-10-14 ENCOUNTER — HOSPITAL ENCOUNTER (OUTPATIENT)
Dept: PHYSICAL THERAPY | Age: 34
Setting detail: RECURRING SERIES
Discharge: HOME | End: 2024-10-17

## 2024-10-14 PROCEDURE — 97113 AQUATIC THERAPY/EXERCISES: CPT

## 2024-10-14 NOTE — PROGRESS NOTES
Tiffanie Tejeda  : 1990  Primary:  (Self-Pay)  Secondary:  [facility] @ [facility] [address] [place] [address]  Phone: [phone]  Fax: [phone]    Plan of Care/Certification Expiration Date: 25        Plan of Care/Certification Expiration Date:  Plan of Care/Certification Expiration Date: 25    Frequency/Duration:      Time In/Out:   Time In: 1615  Time Out: 1700      PT Visit Info:    Total # of Visits to Date: 13      Visit Count:  13    OUTPATIENT PHYSICAL THERAPY:   Treatment Note 10/14/2024       Episode  (joint pain multiple sites)               Treatment Diagnosis:    No data found  Medical/Referring Diagnosis:    Fibromyalgia  Lupus      Referring Physician:  Chencho Jackson MD MD Orders:  PT Eval and Treat   Return MD Appt:  prn   Date of Onset:  Onset Date: 22     Allergies:   Patient has no known allergies.  Restrictions/Precautions:   None      Interventions Planned (Treatment may consist of any combination of the following):       Subjective Comments:  Pt reports she has missed being at the pool.     Initial Pain Level::   no VAS   /10 neck shoulders arms hands  Post Session Pain Level:      no VAS /10  Medications Last Reviewed:  10/14/2024  Updated Objective Findings:  None Today  Treatment   Aquatic Therapy (45 minutes):     Aquatic Exercise Log       Date  10/14/24 Date   Date   Date   Date     Activity/ Exercise Parameters    Some with 4# wts Parameters Parameters Parameters Parameters   Walking forward 4 lengths        Walking backward 4 lengths       Walking sideways 4 lengths         Marching 4 lengths         Goose Step 4 lengths         Tip toes 2 lengths         Heels 2 lengths         Lunges 4 lengths       Side step squats        LE Exercises 4# wts         Hip Flex/Ext x10 B          Hip Abd/Add x10 B          Hip IR/ER          Calf raises          Knee Flex x10 B          Squats          Leg Circles x10 B each

## 2024-10-15 ENCOUNTER — OFFICE VISIT (OUTPATIENT)
Dept: FAMILY MEDICINE CLINIC | Facility: CLINIC | Age: 34
End: 2024-10-15

## 2024-10-15 VITALS
TEMPERATURE: 98.4 F | WEIGHT: 177.6 LBS | OXYGEN SATURATION: 95 % | BODY MASS INDEX: 34.69 KG/M2 | HEART RATE: 98 BPM | SYSTOLIC BLOOD PRESSURE: 114 MMHG | DIASTOLIC BLOOD PRESSURE: 74 MMHG

## 2024-10-15 DIAGNOSIS — E66.811 CLASS 1 OBESITY WITHOUT SERIOUS COMORBIDITY WITH BODY MASS INDEX (BMI) OF 34.0 TO 34.9 IN ADULT, UNSPECIFIED OBESITY TYPE: Primary | ICD-10-CM

## 2024-10-15 PROCEDURE — 99213 OFFICE O/P EST LOW 20 MIN: CPT | Performed by: FAMILY MEDICINE

## 2024-10-15 RX ORDER — TOPIRAMATE 25 MG/1
TABLET, FILM COATED ORAL
Qty: 60 TABLET | Refills: 5 | Status: SHIPPED | OUTPATIENT
Start: 2024-10-15

## 2024-10-15 SDOH — ECONOMIC STABILITY: TRANSPORTATION INSECURITY
IN THE PAST 12 MONTHS, HAS LACK OF TRANSPORTATION KEPT YOU FROM MEETINGS, WORK, OR FROM GETTING THINGS NEEDED FOR DAILY LIVING?: NO

## 2024-10-15 SDOH — ECONOMIC STABILITY: FOOD INSECURITY: WITHIN THE PAST 12 MONTHS, THE FOOD YOU BOUGHT JUST DIDN'T LAST AND YOU DIDN'T HAVE MONEY TO GET MORE.: NEVER TRUE

## 2024-10-15 SDOH — ECONOMIC STABILITY: FOOD INSECURITY: WITHIN THE PAST 12 MONTHS, YOU WORRIED THAT YOUR FOOD WOULD RUN OUT BEFORE YOU GOT MONEY TO BUY MORE.: NEVER TRUE

## 2024-10-15 SDOH — ECONOMIC STABILITY: INCOME INSECURITY: HOW HARD IS IT FOR YOU TO PAY FOR THE VERY BASICS LIKE FOOD, HOUSING, MEDICAL CARE, AND HEATING?: NOT HARD AT ALL
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
## 2024-10-21 ENCOUNTER — HOSPITAL ENCOUNTER (OUTPATIENT)
Dept: PHYSICAL THERAPY | Age: 34
Setting detail: RECURRING SERIES
Discharge: HOME | End: 2024-10-24

## 2024-10-21 PROCEDURE — 97113 AQUATIC THERAPY/EXERCISES: CPT

## 2024-10-21 NOTE — PROGRESS NOTES
Tiffanie Tejeda  : 1990  Primary:  (Self-Pay)  Secondary:  [facility] @ [facility] [address] [place] [address]  Phone: [phone]  Fax: [phone]    Plan of Care/Certification Expiration Date: 25        Plan of Care/Certification Expiration Date:  Plan of Care/Certification Expiration Date: 25    Frequency/Duration:      Time In/Out:   Time In: 1600  Time Out: 1700      PT Visit Info:    Total # of Visits to Date: 14      Visit Count:  14    OUTPATIENT PHYSICAL THERAPY:   Treatment Note 10/21/2024       Episode  (joint pain multiple sites)               Treatment Diagnosis:    No data found  Medical/Referring Diagnosis:    Fibromyalgia  Lupus      Referring Physician:  Chencho Jackson MD MD Orders:  PT Eval and Treat   Return MD Appt:  prn   Date of Onset:  Onset Date: 22     Allergies:   Patient has no known allergies.  Restrictions/Precautions:   None      Interventions Planned (Treatment may consist of any combination of the following):       Subjective Comments:  Pt reports a change in medicine and feeling a little dizzy today. She went back to work for the first time today and having some pain/swelling in her hands.     Initial Pain Level::   no VAS   /10   Post Session Pain Level:      no VAS /10  Medications Last Reviewed:  10/21/2024  Updated Objective Findings:  None Today  Treatment   Aquatic Therapy (60 minutes):     Aquatic Exercise Log       Date  10/14/24 Date  10/21/24 Date   Date   Date     Activity/ Exercise Parameters    Some with 4# wts Parameters Parameters Parameters Parameters   Walking forward 4 lengths  4 lengths       Walking backward 4 lengths 4 lengths       Walking sideways 4 lengths 4 lengths         Marching 4 lengths 4 lengths         Goose Step 4 lengths 4 lengths         Tip toes 2 lengths 2 lengths         Heels 2 lengths 2 lengths         Lunges 4 lengths 4 lengths       Side step squats        LE Exercises

## 2024-10-22 ENCOUNTER — OFFICE VISIT (OUTPATIENT)
Dept: RHEUMATOLOGY | Age: 34
End: 2024-10-22

## 2024-10-22 VITALS
SYSTOLIC BLOOD PRESSURE: 102 MMHG | DIASTOLIC BLOOD PRESSURE: 69 MMHG | HEART RATE: 85 BPM | HEIGHT: 60 IN | WEIGHT: 177.8 LBS | BODY MASS INDEX: 34.91 KG/M2

## 2024-10-22 DIAGNOSIS — M32.9 SYSTEMIC LUPUS ERYTHEMATOSUS, UNSPECIFIED SLE TYPE, UNSPECIFIED ORGAN INVOLVEMENT STATUS (HCC): Primary | ICD-10-CM

## 2024-10-22 DIAGNOSIS — M79.7 FIBROMYALGIA: ICD-10-CM

## 2024-10-22 DIAGNOSIS — I73.00 RAYNAUD'S DISEASE WITHOUT GANGRENE: ICD-10-CM

## 2024-10-22 PROCEDURE — 99214 OFFICE O/P EST MOD 30 MIN: CPT | Performed by: INTERNAL MEDICINE

## 2024-10-22 RX ORDER — GABAPENTIN 300 MG/1
CAPSULE ORAL
COMMUNITY

## 2024-10-22 RX ORDER — HYDROXYCHLOROQUINE SULFATE 200 MG/1
TABLET, FILM COATED ORAL
Qty: 60 TABLET | Refills: 3 | Status: SHIPPED | OUTPATIENT
Start: 2024-10-22

## 2024-10-22 ASSESSMENT — ROUTINE ASSESSMENT OF PATIENT INDEX DATA (RAPID3)
ON A SCALE OF ONE TO TEN, HOW MUCH OF A PROBLEM HAS UNUSUAL FATIGUE OR TIREDNESS BEEN FOR YOU OVER THE PAST WEEK?: 8
ON A SCALE OF ONE TO TEN, CONSIDERING ALL THE WAYS IN WHICH ILLNESS AND HEALTH CONDITIONS MAY AFFECT YOU AT THIS TIME, PLEASE INDICATE BELOW HOW YOU ARE DOING:: 5
ON A SCALE OF ONE TO TEN, HOW MUCH PAIN HAVE YOU HAD BECAUSE OF YOUR CONDITION OVER THE PAST WEEK?: 4
WHEN YOU AWAKENED IN THE MORNING OVER THE LAST WEEK, PLEASE INDICATE THE AMOUNT OF TIME IT TAKES UNTIL YOU ARE AS LIMBER AS YOU WILL BE FOR THE DAY: 30 MIN
ON A SCALE OF ONE TO TEN, HOW DIFFICULT WAS IT FOR YOU TO COMPLETE THE LISTED DAILY PHYSICAL TASKS OVER THE LAST WEEK: 0.9

## 2024-10-22 ASSESSMENT — JOINT PAIN
TOTAL NUMBER OF TENDER JOINTS: 22
TOTAL NUMBER OF SWOLLEN JOINTS: 10

## 2024-10-28 ENCOUNTER — HOSPITAL ENCOUNTER (OUTPATIENT)
Dept: PHYSICAL THERAPY | Age: 34
Setting detail: RECURRING SERIES
Discharge: HOME | End: 2024-10-31

## 2024-10-28 PROCEDURE — 97113 AQUATIC THERAPY/EXERCISES: CPT

## 2024-10-28 NOTE — PROGRESS NOTES
step squats        LE Exercises 4# wts Large noodle  Large noodle and 4# wts       Hip Flex/Ext x10 B  X15 B X15 B       Hip Abd/Add x10 B  X15 B X15 B       Hip IR/ER          Calf raises          Knee Flex x10 B  X15 B X15 B       Squats          Leg Circles x10 B each direction  X15 B each direction  X15 B each direction       Step Ups  X10 B forward and lateral each       UE Exercises          Squeeze In  Push up on large noodle x10         Push Down          Pull Down          Bicep/Tricep        Rows/Press outs         Chi Positions          Trunk Rotation        Deep H2O/ Noodles Noodle under arms  Straddled noodle  Straddled noodle with 4# wts       Stabilization          Arms only          Legs only Bicycle 2 min Arms and leg forward and backward x 3 laps each Arms and leg forward and backward x 2 laps each     [place] 2 min  2 min       Scissors 2 min  2 min       Crab walk        Lower abdominal   work           Cardio          Jogging        Lap   Swimming          Stretches Noodle under ankle  Noodle under ankle         Hamstrings yes yes        Heelcords          Piriformis yes IT band  yes        Neck          Treatment/Session Summary:    Treatment Assessment:  Pt worked again today and felt better today. Her hands did not seem swollen.   Communication/Consultation:  None today  Equipment provided today:  HEP  Recommendations/Intent for next treatment session: Next visit will focus on aquatics and continue with HEP when seen on land.    >Total Treatment Billable Duration:  50 minutes   Time In: 1615  Time Out: 1705    Rozina Bradshaw PTA         Charge Capture  Events  [employer] Portal  Appt Desk  Attendance Report     Future Appointments   Date Time Provider Department Center   11/4/2024  3:30 PM Rozina Bradshaw PTA [facility]   11/7/2024  3:15 PM Oksana Lauren PTA [facility]   11/8/2024  9:00 AM Mally Silva PA PM [facility]   11/11/2024  4:00 PM Rozina Bradshaw PTA

## 2024-11-04 ENCOUNTER — HOSPITAL ENCOUNTER (OUTPATIENT)
Dept: PHYSICAL THERAPY | Age: 34
Setting detail: RECURRING SERIES
Discharge: HOME | End: 2024-11-07

## 2024-11-04 PROCEDURE — 97113 AQUATIC THERAPY/EXERCISES: CPT

## 2024-11-04 NOTE — PROGRESS NOTES
Tiffanie Tejeda  : 1990  Primary:  (Self-Pay)  Secondary:  [facility] @ [facility] [address] [place] [address]  Phone: [phone]  Fax: [phone]    Plan of Care/Certification Expiration Date: 25        Plan of Care/Certification Expiration Date:  Plan of Care/Certification Expiration Date: 25    Frequency/Duration:      Time In/Out:   Time In: 1545  Time Out: 1630      PT Visit Info:    Total # of Visits to Date: 16  No Show: 1      Visit Count:  16    OUTPATIENT PHYSICAL THERAPY:   Treatment Note 2024       Episode  (joint pain multiple sites)               Treatment Diagnosis:    No data found  Medical/Referring Diagnosis:    Fibromyalgia  Lupus      Referring Physician:  Chencho Jackson MD MD Orders:  PT Eval and Treat   Return MD Appt:  prn   Date of Onset:  Onset Date: 22     Allergies:   Patient has no known allergies.  Restrictions/Precautions:   None      Interventions Planned (Treatment may consist of any combination of the following):       Subjective Comments:  Pt states she went to the [facility] last week and was able to use the stair climber and elliptical but was very sore the next day.       Initial Pain Level::   no VAS   /10   Post Session Pain Level:      no VAS /10  Medications Last Reviewed:  2024  Updated Objective Findings:  None Today  Treatment   Aquatic Therapy (45 minutes):     Aquatic Exercise Log       Date  10/14/24 Date  10/21/24 Date  10/28/24 Date  24 Date     Activity/ Exercise Parameters    Some with 4# wts Parameters Parameters    4# wts Parameters    4# wts Parameters   Walking forward 4 lengths  4 lengths  4 lengths  4 lengths    Walking backward 4 lengths 4 lengths  4 lengths  4 lengths    Walking sideways 4 lengths 4 lengths  4 lengths  4 lengths      Marching 4 lengths 4 lengths  4 lengths  4 lengths      Goose Step 4 lengths 4 lengths  4 lengths  4 lengths      Tip toes 2 lengths 2 lengths

## 2024-11-05 ENCOUNTER — PATIENT MESSAGE (OUTPATIENT)
Dept: FAMILY MEDICINE CLINIC | Facility: CLINIC | Age: 34
End: 2024-11-05

## 2024-11-05 DIAGNOSIS — M79.641 BILATERAL HAND PAIN: Primary | ICD-10-CM

## 2024-11-05 DIAGNOSIS — I73.00 RAYNAUD'S DISEASE WITHOUT GANGRENE: ICD-10-CM

## 2024-11-05 DIAGNOSIS — M79.642 BILATERAL HAND PAIN: Primary | ICD-10-CM

## 2024-11-05 RX ORDER — AMLODIPINE BESYLATE 2.5 MG/1
2.5 TABLET ORAL DAILY
Qty: 30 TABLET | Refills: 0 | Status: SHIPPED | OUTPATIENT
Start: 2024-11-05 | End: 2024-11-05 | Stop reason: SDUPTHER

## 2024-11-05 RX ORDER — AMLODIPINE BESYLATE 2.5 MG/1
2.5 TABLET ORAL DAILY
Qty: 90 TABLET | Refills: 3 | Status: SHIPPED | OUTPATIENT
Start: 2024-11-05

## 2024-11-05 RX ORDER — MELOXICAM 15 MG/1
15 TABLET ORAL DAILY
Qty: 90 TABLET | Refills: 3 | Status: SHIPPED | OUTPATIENT
Start: 2024-11-05

## 2024-11-05 RX ORDER — MELOXICAM 15 MG/1
15 TABLET ORAL DAILY
Qty: 30 TABLET | Refills: 0 | Status: SHIPPED | OUTPATIENT
Start: 2024-11-05 | End: 2024-11-05 | Stop reason: SDUPTHER

## 2024-11-11 ENCOUNTER — HOSPITAL ENCOUNTER (OUTPATIENT)
Dept: PHYSICAL THERAPY | Age: 34
Setting detail: RECURRING SERIES
Discharge: HOME | End: 2024-11-14

## 2024-11-11 PROCEDURE — 97113 AQUATIC THERAPY/EXERCISES: CPT

## 2024-11-11 NOTE — PROGRESS NOTES
Tiffanie Tejeda  : 1990  Primary:  (Self-Pay)  Secondary:  [facility] @ [facility] [address] [place] [address]  Phone: [phone]  Fax: [phone]    Plan of Care/Certification Expiration Date: 25        Plan of Care/Certification Expiration Date:  Plan of Care/Certification Expiration Date: 25    Frequency/Duration:      Time In/Out:   Time In: 1600  Time Out: 1645      PT Visit Info:    Total # of Visits to Date: 17  No Show: 2      Visit Count:  17    OUTPATIENT PHYSICAL THERAPY:   Treatment Note 2024       Episode  (joint pain multiple sites)               Treatment Diagnosis:    No data found  Medical/Referring Diagnosis:    Fibromyalgia  Lupus      Referring Physician:  Chencho Jackson MD MD Orders:  PT Eval and Treat   Return MD Appt:  prn   Date of Onset:  Onset Date: 22     Allergies:   Patient has no known allergies.  Restrictions/Precautions:   None      Interventions Planned (Treatment may consist of any combination of the following):       Subjective Comments:  Pt states her ride was late last week picking her up so she was not able to come to appointment. She was able to go back to the [facility] last week also.        Initial Pain Level::   no VAS   /10   Post Session Pain Level:      no VAS /10  Medications Last Reviewed:  2024  Updated Objective Findings:  None Today  Treatment   Aquatic Therapy (45 minutes):     Aquatic Exercise Log       Date  24 Date  24   Activity/ Exercise Parameters    4# wts Parameters   Walking forward 4 lengths 4 lengths   Walking backward 4 lengths 4 lengths   Walking sideways 4 lengths 4 lengths     Marching 4 lengths 4 lengths     Goose Step 4 lengths 4 lengths     Tip toes 2 lengths 2 lengths     Heels 2 lengths 2 lengths     Lunges 4 lengths 4 lengths   Side step squats     LE Exercises  Large noodle      Hip Flex/Ext  X10 B     Hip Abd/Add  X10 B     Hip IR/ER       Calf raises

## 2024-11-14 ENCOUNTER — HOSPITAL ENCOUNTER (OUTPATIENT)
Dept: PHYSICAL THERAPY | Age: 34
Setting detail: RECURRING SERIES
Discharge: HOME | End: 2024-11-17

## 2024-11-14 PROCEDURE — 97110 THERAPEUTIC EXERCISES: CPT

## 2024-11-18 ENCOUNTER — HOSPITAL ENCOUNTER (OUTPATIENT)
Dept: PHYSICAL THERAPY | Age: 34
Setting detail: RECURRING SERIES
End: 2024-11-18

## 2024-11-18 ENCOUNTER — OFFICE VISIT (OUTPATIENT)
Age: 34
End: 2024-11-18

## 2024-11-18 DIAGNOSIS — M79.642 BILATERAL HAND PAIN: ICD-10-CM

## 2024-11-18 DIAGNOSIS — M79.641 BILATERAL HAND PAIN: ICD-10-CM

## 2024-11-18 DIAGNOSIS — M79.7 FIBROMYALGIA: Primary | ICD-10-CM

## 2024-11-18 PROCEDURE — 99214 OFFICE O/P EST MOD 30 MIN: CPT | Performed by: PHYSICIAN ASSISTANT

## 2024-11-18 RX ORDER — PREGABALIN 50 MG/1
50 CAPSULE ORAL EVERY MORNING
Qty: 30 CAPSULE | Refills: 2 | Status: SHIPPED | OUTPATIENT
Start: 2024-11-18 | End: 2025-02-16

## 2024-11-18 RX ORDER — PREGABALIN 100 MG/1
100 CAPSULE ORAL 3 TIMES DAILY
Qty: 90 CAPSULE | Refills: 2 | Status: SHIPPED | OUTPATIENT
Start: 2024-11-18 | End: 2025-02-16

## 2024-11-18 ASSESSMENT — ENCOUNTER SYMPTOMS
ABDOMINAL PAIN: 0
SHORTNESS OF BREATH: 0
ALLERGIC/IMMUNOLOGIC NEGATIVE: 1
EYES NEGATIVE: 1

## 2024-11-18 NOTE — PROGRESS NOTES
Ingrid is a first time f/u for a new patient visit for pain secondary to fibromyalgia. We transitioned her from Gabapentin 600mg qam, 600mg in afternoon and 900mg at bedtime to Lyrica 100mg TID. We discussed that this was adjustable. Her PCP also just added Topamax to help with weight loss. She continues to go to water therapy and that is helpful for making her feel better in general. She did discontinue her relationship with Dr Calhoun.   
Medication Results   NSAIDs Ibuprofen, mobic  no benefit   Oral steroids      Tylenol       Antiepileptics  gabapentin  mild benefit - helps sleep   Antidepressants  Elavil     Relaxants  flexeril  mild benefit - helps sleep   Topicals/transdermaI patches       Narcotics  suboxone  moderate benefit          Previous tests/studies:  Study Date Results   CT ABDOMEN AND PELVIS 4/25/2024 Narrative & Impression  CT ABDOMEN AND PELVIS     INDICATION: Pain     TECHNIQUE: Multiple 2D axial images were obtained through the abdomen and pelvis  without intravenous or oral contrast.  Radiation dose reduction techniques were  used for this study:  All CT scans performed at this facility use one or all of  the following: Automated exposure control, adjustment of the mA and/or kVp  according to patient's size, iterative reconstruction.     COMPARISON: CT abdomen pelvis October 17, 2023     FINDINGS:  - KIDNEYS/URETERS: Right renal punctate cortical stones. No hydronephrosis.  - BLADDER: Normal.     - LUNG BASES: No infiltrates or masses.  - LIVER: Normal in size and appearance.    - GALLBLADDER/BILE DUCTS: No gallstones or bile duct dilatation.  - PANCREAS: Normal.  - SPLEEN: Normal.  - ADRENALS: Normal.     - REPRODUCTIVE ORGANS: No pelvic masses.  - BOWEL: Normal caliber.  No inflammatory changes. Prominent stool throughout  the colon. Small hiatal hernia.  - LYMPH NODES: No significant retroperitoneal, mesenteric, or pelvic adenopathy.  - BONES: No fracture or significant bone lesion.  - VASCULATURE: Normal  - OTHER: No ascites.     IMPRESSION:  No obstructive uropathy.     Prominent stool throughout the colon.   XR CHEST 3/27/2024 Narrative & Impression  Chest X-ray     INDICATION: Chest pain     COMPARISON:  None     TECHNIQUE: PA and lateral views of the chest were obtained.     FINDINGS: The lungs are clear. There are no infiltrates or effusions.  The heart  size is normal.  The bony thorax is intact.       IMPRESSION:  No

## 2024-11-19 ENCOUNTER — PATIENT MESSAGE (OUTPATIENT)
Dept: FAMILY MEDICINE CLINIC | Facility: CLINIC | Age: 34
End: 2024-11-19

## 2024-11-19 DIAGNOSIS — I73.00 RAYNAUD'S DISEASE WITHOUT GANGRENE: Primary | ICD-10-CM

## 2024-11-19 DIAGNOSIS — M32.9 SYSTEMIC LUPUS ERYTHEMATOSUS, UNSPECIFIED SLE TYPE, UNSPECIFIED ORGAN INVOLVEMENT STATUS (HCC): ICD-10-CM

## 2024-11-25 ENCOUNTER — HOSPITAL ENCOUNTER (OUTPATIENT)
Dept: PHYSICAL THERAPY | Age: 34
Setting detail: RECURRING SERIES
End: 2024-11-25

## 2024-12-02 DIAGNOSIS — M79.641 BILATERAL HAND PAIN: ICD-10-CM

## 2024-12-02 DIAGNOSIS — M79.642 BILATERAL HAND PAIN: ICD-10-CM

## 2024-12-02 RX ORDER — MELOXICAM 15 MG/1
15 TABLET ORAL DAILY
Qty: 90 TABLET | Refills: 3 | OUTPATIENT
Start: 2024-12-02

## 2024-12-03 ENCOUNTER — HOSPITAL ENCOUNTER (OUTPATIENT)
Dept: PHYSICAL THERAPY | Age: 34
Setting detail: RECURRING SERIES
Discharge: HOME | End: 2024-12-06

## 2024-12-03 PROCEDURE — 97113 AQUATIC THERAPY/EXERCISES: CPT

## 2024-12-03 NOTE — PROGRESS NOTES
Tiffanie Tejeda  : 1990  Primary:  (Self-Pay)  Secondary:  [facility] @ [facility] [place] [address]  Phone: [phone]  Fax: [phone]    Plan of Care/Certification Expiration Date: 25        Plan of Care/Certification Expiration Date:  Plan of Care/Certification Expiration Date: 25    Frequency/Duration:      Time In/Out:   Time In: 1505  Time Out: 1600      PT Visit Info:    Total # of Visits to Date: 19  No Show: 2      Visit Count:  19    OUTPATIENT PHYSICAL THERAPY:   Treatment Note 12/3/2024       Episode  (joint pain multiple sites)               Treatment Diagnosis:    No data found  Medical/Referring Diagnosis:    Fibromyalgia  Lupus      Referring Physician:  Chencho Jackson MD MD Orders:  PT Eval and Treat   Return MD Appt:  prn   Date of Onset:  Onset Date: 22     Allergies:   Patient has no known allergies.  Restrictions/Precautions:   None      Interventions Planned (Treatment may consist of any combination of the following):       Subjective Comments:  Patient was happy to have made it to this appointment today.  She reports she really did need to de-stress.         Initial Pain Level::   no VAS   /10   Post Session Pain Level:      no VAS /10  Medications Last Reviewed:  12/3/2024  Updated Objective Findings:  None Today  Treatment   Aquatic Therapy ( 55 minutes ):     Aquatic Exercise Log       Date  24 Date  24 Date:  12-3-24   Activity/ Exercise Parameters    4# wts Parameters     4# wts   Walking forward 4 lengths 4 lengths 4 lengths   Walking backward 4 lengths 4 lengths 4 lengths   Walking sideways 4 lengths 4 lengths 4 lengths     Marching 4 lengths 4 lengths 4 lengths     Goose Step 4 lengths 4 lengths 4 lengths     Tip toes 2 lengths 2 lengths      Heels 2 lengths 2 lengths      Lunges 4 lengths 4 lengths    Side step squats      LE Exercises  Large noodle  4#      Hip Flex/Ext  X10 B X 10 B

## 2024-12-06 ENCOUNTER — TELEPHONE (OUTPATIENT)
Dept: FAMILY MEDICINE CLINIC | Facility: CLINIC | Age: 34
End: 2024-12-06

## 2024-12-06 NOTE — TELEPHONE ENCOUNTER
Pt called and complaining of SOB fatigue having chills and then will get hot. Please call pt back and advise

## 2024-12-07 NOTE — TELEPHONE ENCOUNTER
Medical assistant will call patient.  Sent patient a [employer] message also with Dr. Jackson's message.

## 2024-12-09 ENCOUNTER — HOSPITAL ENCOUNTER (OUTPATIENT)
Dept: PHYSICAL THERAPY | Age: 34
Setting detail: RECURRING SERIES
Discharge: HOME | End: 2024-12-12

## 2024-12-09 PROCEDURE — 97113 AQUATIC THERAPY/EXERCISES: CPT

## 2024-12-09 NOTE — PROGRESS NOTES
Tiffanietangela Tejeda  : 1990  Primary:  (Self-Pay)  Secondary:  [facility] @ [facility] [place] [address]  Phone: [phone]  Fax: [phone]    Plan of Care/Certification Expiration Date: 25        Plan of Care/Certification Expiration Date:  Plan of Care/Certification Expiration Date: 25    Frequency/Duration:      Time In/Out:   Time In: 1345  Time Out: 1430      PT Visit Info:    Total # of Visits to Date: 20  No Show: 2      Visit Count:  20    OUTPATIENT PHYSICAL THERAPY:   Treatment Note 2024       Episode  (joint pain multiple sites)               Treatment Diagnosis:    No data found  Medical/Referring Diagnosis:    Fibromyalgia  Lupus      Referring Physician:  Chencho Jackson MD MD Orders:  PT Eval and Treat   Return MD Appt:  prn   Date of Onset:  Onset Date: 22     Allergies:   Patient has no known allergies.  Restrictions/Precautions:   None      Interventions Planned (Treatment may consist of any combination of the following):       Subjective Comments:  Pt very tearful today and states she does not feel well. She is hoping to have a appointment with her PCP soon. She also states she feels stressed about her finals this week and meeting with  about her name change.         Initial Pain Level::   no VAS   /10   Post Session Pain Level:      no VAS /10  Medications Last Reviewed:  2024  Updated Objective Findings:  None Today  Treatment   Aquatic Therapy ( 45 minutes ):     Aquatic Exercise Log       Date  24 Date  24 Date:  12-3-24 Date  24   Activity/ Exercise Parameters    4# wts Parameters     4# wts 4# wts   Walking forward 4 lengths 4 lengths 4 lengths 4 lengths   Walking backward 4 lengths 4 lengths 4 lengths 4 lengths   Walking sideways 4 lengths 4 lengths 4 lengths 4 lengths     Marching 4 lengths 4 lengths 4 lengths 4 lengths     Goose Step 4 lengths 4 lengths 4 lengths 4 lengths

## 2024-12-16 DIAGNOSIS — M79.7 FIBROMYALGIA: ICD-10-CM

## 2024-12-16 DIAGNOSIS — I73.00 RAYNAUD'S DISEASE WITHOUT GANGRENE: ICD-10-CM

## 2024-12-16 RX ORDER — AMLODIPINE BESYLATE 2.5 MG/1
2.5 TABLET ORAL DAILY
Qty: 90 TABLET | Refills: 3 | OUTPATIENT
Start: 2024-12-16

## 2024-12-16 RX ORDER — CYCLOBENZAPRINE HCL 10 MG
10 TABLET ORAL NIGHTLY PRN
Qty: 90 TABLET | Refills: 3 | OUTPATIENT
Start: 2024-12-16

## 2024-12-17 ENCOUNTER — HOSPITAL ENCOUNTER (OUTPATIENT)
Dept: PHYSICAL THERAPY | Age: 34
Setting detail: RECURRING SERIES
Discharge: HOME | End: 2024-12-20

## 2024-12-17 PROCEDURE — 97113 AQUATIC THERAPY/EXERCISES: CPT

## 2024-12-17 NOTE — PROGRESS NOTES
Tiffanie Spauldinger  : 1990  Primary:  (Self-Pay)  Secondary:  [facility] [place] [address]  Phone: [phone]  Fax: [phone]    Plan of Care/Certification Expiration Date: 25        Plan of Care/Certification Expiration Date:  Plan of Care/Certification Expiration Date: 25    Frequency/Duration:      Time In/Out:   Time In: 1514  Time Out: 1607      PT Visit Info:    Total # of Visits to Date: 21  No Show: 2      Visit Count:  21    OUTPATIENT PHYSICAL THERAPY:   Treatment Note 2024       Episode  (joint pain multiple sites)               Treatment Diagnosis:    No data found  Medical/Referring Diagnosis:    Fibromyalgia  Lupus      Referring Physician:  Chencho Jackson MD MD Orders:  PT Eval and Treat   Return MD Appt:  prn   Date of Onset:  Onset Date: 22     Allergies:   Patient has no known allergies.  Restrictions/Precautions:   None      Interventions Planned (Treatment may consist of any combination of the following):       Subjective Comments:  Patient is doing fairly well today.  She did report she was over whelmed with all that is going on in her life right now.          Initial Pain Level::   no VAS   /10   Post Session Pain Level:      no VAS /10  Medications Last Reviewed:  2024  Updated Objective Findings:  None Today  Treatment   Aquatic Therapy ( 53 minutes ):     Aquatic Exercise Log       Date  24 Date  24 Date:  12-3-24 Date  24 Date:  24   Activity/ Exercise Parameters    4# wts Parameters     4# wts 4# wts    Walking forward 4 lengths 4 lengths 4 lengths 4 lengths 4 lengths   Walking backward 4 lengths 4 lengths 4 lengths 4 lengths 4 lengths   Walking sideways 4 lengths 4 lengths 4 lengths 4 lengths 4 lengths     Marching 4 lengths 4 lengths 4 lengths 4 lengths 4 lengths     Goose Step 4 lengths 4 lengths 4 lengths 4 lengths 4 lengths     Tip toes 2 lengths 2 lengths

## 2024-12-23 DIAGNOSIS — I73.00 RAYNAUD'S DISEASE WITHOUT GANGRENE: ICD-10-CM

## 2024-12-23 DIAGNOSIS — M79.7 FIBROMYALGIA: ICD-10-CM

## 2024-12-23 RX ORDER — AMLODIPINE BESYLATE 2.5 MG/1
2.5 TABLET ORAL DAILY
Qty: 90 TABLET | Refills: 3 | Status: SHIPPED | OUTPATIENT
Start: 2024-12-23

## 2024-12-23 RX ORDER — CYCLOBENZAPRINE HCL 10 MG
10 TABLET ORAL NIGHTLY PRN
Qty: 90 TABLET | Refills: 3 | Status: SHIPPED | OUTPATIENT
Start: 2024-12-23

## 2024-12-23 NOTE — TELEPHONE ENCOUNTER
Pt called about refills on medication, Spoke with belle and pt has refills on Amlodipine that is due in 5 days, the flexeril will need to be sent to a local pharmacy because they do not carry flexeril. I have pended the medication and added the pharmacy.

## 2024-12-23 NOTE — TELEPHONE ENCOUNTER
Requesting refill never received rx 11/05 from [facility] had it changed to [facility]. 30 was sent 11/5/24.      [facility] can send but want receive it until 2 weeks.    Please advise

## 2024-12-30 ENCOUNTER — HOSPITAL ENCOUNTER (OUTPATIENT)
Dept: PHYSICAL THERAPY | Age: 34
Setting detail: RECURRING SERIES
Discharge: HOME | End: 2025-01-02

## 2024-12-30 PROCEDURE — 97113 AQUATIC THERAPY/EXERCISES: CPT

## 2024-12-30 NOTE — PROGRESS NOTES
X 20 B     Hip Abd/Add X 10 B X 20 B X 20 B     Hip IR/ER        Calf raises        Knee Flex X 10 B X 20 B X 20 B     Squats        Leg Circles X 10 B each direction  X 10 B each direction  X 20 B each direction      Step Ups  Red step   With high knee   X 10 B lateral x 10 B    UE Exercises        Squeeze In        Push Down        Pull Down        Bicep/Tricep      Rows/Press outs       Chi Positions        Trunk Rotation      Deep H2O/ Noodles Large noodle under arms Large noodle       Stabilization        Arms only        Legs only Arms and leg forward and backward x 2 laps each Straddle - arms and legs   4 lengths Straddle - arms and legs   4 lengths   Cross   Country 2 min 2 min 2 min     Scissors 2 min 2 min 2 min     Crab walk      Lower abdominal   work         Cardio        Jogging      Lap   Swimming        Stretches   Large noodle under ankle      Hamstrings   yes     Heelcords        Piriformis   Yes - IT band     Neck        Treatment/Session Summary:    Treatment Assessment:  Pt did well in the water today and was able to float on 2 large noodles at end of session for relaxation and weightlessness.       Communication/Consultation:  None today  Equipment provided today:  HEP  Recommendations/Intent for next treatment session: Next visit will focus on aquatics and continue with HEP when seen on land.    >Total Treatment Billable Duration:  60 minutes   Time In: 1515  Time Out: 1615    Rozina Bradshaw PTA         Charge Capture  Events  Exchangery Portal  Appt Desk  Attendance Report     Future Appointments   Date Time Provider Department Center   1/9/2025  9:00 AM Anita Garber PT [facility]   1/15/2025  8:45 AM Norma Ritchie MD [facility]   1/15/2025  4:00 PM Dru Foley MD [facility]   2/3/2025  9:45 AM Milan Crawford MD [facility]

## 2025-01-09 ENCOUNTER — HOSPITAL ENCOUNTER (OUTPATIENT)
Dept: PHYSICAL THERAPY | Age: 35
Setting detail: RECURRING SERIES
Discharge: HOME | End: 2025-01-12

## 2025-01-09 PROCEDURE — 97110 THERAPEUTIC EXERCISES: CPT

## 2025-01-09 NOTE — PROGRESS NOTES
Tiffanie Tejeda  : 1990  Primary:  (Self-Pay)  Secondary:  [facility] @ [facility] [place] [address]  Phone: [phone]  Fax: [phone] Plan Frequency: 1/week x 12 weeks    Plan of Care/Certification Expiration Date: 25        Plan of Care/Certification Expiration Date:  Plan of Care/Certification Expiration Date: 25    Frequency/Duration: Plan Frequency: 1/week x 12 weeks      Time In/Out:   Time In: 0900  Time Out: 0945      PT Visit Info:    Total # of Visits to Date: 23  No Show: 2      Visit Count:  23    OUTPATIENT PHYSICAL THERAPY:   Treatment Note 2025       Episode  (joint pain multiple sites)               Treatment Diagnosis:    No data found  Medical/Referring Diagnosis:    Fibromyalgia  Lupus      Referring Physician:  Chencho Jackson MD MD Orders:  PT Eval and Treat   Return MD Appt:  prn   Date of Onset:  Onset Date: 22     Allergies:   Patient has no known allergies.  Restrictions/Precautions:   None      Interventions Planned (Treatment may consist of any combination of the following):     See Assessment Note    Subjective Comments:   I have not used weight machines in a gym.  Initial Pain Level::   4   /10 neck shoulders arms hands  Post Session Pain Level:      2  /10  Medications Last Reviewed:  2025  Updated Objective Findings:  None Today  Treatment   THERAPEUTIC EXERCISE: (45 minutes):  with re eval  Exercises per grid below to improve mobility.  Required minimal visual and verbal cues to promote proper body alignment and promote proper body posture.  Progressed range and repetitions as indicated.   Date  25   Activity/Exercise    Leg press 25# 2 x 10   rows 15# 2 x 10   Chest press 10# 2 x 10   Leg ext 10# 2 x 10       Single knee to chest    Shoulder shrugs  Shoulder rolls    Cervical retraction    Cervical retraction with ext    Lumbar extension in standing 2 x 10       Treatment/Session

## 2025-01-09 NOTE — THERAPY EVALUATION
Tiffanie NABIL Ileana  : 1990  Primary:  (Self-Pay)  Secondary:  [facility] [place] [address]  Phone: [phone]  Fax: [phone] Plan Frequency: 1/week x 12 weeks    Plan of Care/Certification Expiration Date: 25        Plan of Care/Certification Expiration Date:  Plan of Care/Certification Expiration Date: 25    Frequency/Duration: Plan Frequency: 1/week x 12 weeks      Time In/Out:   Time In: 0900  Time Out: 0945      PT Visit Info:    Total # of Visits to Date: 23  No Show: 2      Visit Count:  23                OUTPATIENT PHYSICAL THERAPY:             Recertification 2025               Episode (joint pain multiple sites)         Treatment Diagnosis:     Pain in joint involving multiple sites  Chronic left shoulder pain  Chronic right shoulder pain  Neck pain  Other low back pain  Difficulty in walking, not elsewhere classified  Medical/Referring Diagnosis:    Fibromyalgia  Lupus      Referring Physician:  Chencho Jackson MD MD Orders:  PT Eval and Treat   Return MD Appt:  prn  Date of Onset:  Onset Date: 22     Allergies:  Patient has no known allergies.  Restrictions/Precautions:    None      Medications Last Reviewed:  2025     SUBJECTIVE   History of Injury/Illness (Reason for Referral):  Pt presents with diagnosis of lupus and fibromyalgia in December. Pt states main problems are pain and weakness mostly in LE, shoulders and neck.  Pt also has injuries to dorsum sides of hands and has hand pain. She is here to begin aquatic exercise to get as functional as possible. Pt states today most of the pain is in her shoulders and neck. She states she is stiff from pelvis to LE.  Current situation- Pt states the pool is helping a good deal. She states she is having less pain in general and is learning about the different muscles while she is doing the aquatics. She is performing ex at home.  Current situation- Pt

## 2025-01-15 ENCOUNTER — OFFICE VISIT (OUTPATIENT)
Age: 35
End: 2025-01-15

## 2025-01-15 DIAGNOSIS — M79.7 FIBROMYALGIA: Primary | ICD-10-CM

## 2025-01-15 PROCEDURE — 99214 OFFICE O/P EST MOD 30 MIN: CPT | Performed by: ANESTHESIOLOGY

## 2025-01-15 RX ORDER — PREGABALIN 100 MG/1
100 CAPSULE ORAL 3 TIMES DAILY
Qty: 90 CAPSULE | Refills: 0 | Status: SHIPPED | OUTPATIENT
Start: 2025-02-17 | End: 2025-03-19

## 2025-01-15 ASSESSMENT — ENCOUNTER SYMPTOMS
EYES NEGATIVE: 1
SHORTNESS OF BREATH: 0
ABDOMINAL PAIN: 0
ALLERGIC/IMMUNOLOGIC NEGATIVE: 1

## 2025-01-15 NOTE — PROGRESS NOTES
Food Insecurity: No Food Insecurity (10/15/2024)    Hunger Vital Sign     Worried About Running Out of Food in the Last Year: Never true     Ran Out of Food in the Last Year: Never true   Transportation Needs: Unknown (10/15/2024)    PRAPARE - Transportation     Lack of Transportation (Medical): Not on file     Lack of Transportation (Non-Medical): No   Physical Activity: Not on file   Stress: Not on file   Social Connections: Not on file   Intimate Partner Violence: Not on file   Housing Stability: Unknown (10/15/2024)    Housing Stability Vital Sign     Unable to Pay for Housing in the Last Year: Not on file     Number of Times Moved in the Last Year: Not on file     Homeless in the Last Year: No          No Known Allergies        Outpatient Encounter Medications as of 1/15/2025   Medication Sig Dispense Refill    amLODIPine (NORVASC) 2.5 MG tablet Take 1 tablet by mouth daily 90 tablet 3    cyclobenzaprine (FLEXERIL) 10 MG tablet Take 1 tablet by mouth nightly as needed for Muscle spasms 90 tablet 3    pregabalin (LYRICA) 100 MG capsule Take 1 capsule by mouth 3 times daily for 90 days. Max Daily Amount: 300 mg 90 capsule 2    pregabalin (LYRICA) 50 MG capsule Take 1 capsule by mouth every morning for 90 days. With AM dose of 100mg Max Daily Amount: 50 mg 30 capsule 2    meloxicam (MOBIC) 15 MG tablet Take 1 tablet by mouth daily 90 tablet 3    hydroxychloroquine (PLAQUENIL) 200 MG tablet Take 1 pill twice a day after food. 60 tablet 3    topiramate (TOPAMAX) 25 MG tablet Take one tab PO with supper for two weeks and then increase to one tab PO BID 60 tablet 5    FLUoxetine (PROZAC) 20 MG capsule Take 1 capsule by mouth daily 90 capsule 1    FLUoxetine (PROZAC) 40 MG capsule Take 1 capsule by mouth daily Take with the 20 mg capsule to equal 60 mg daily. 90 capsule 1    prazosin (MINIPRESS) 5 MG capsule Take 1 capsule by mouth nightly 90 capsule 1    prazosin (MINIPRESS) 1 MG capsule Take 1 capsule by mouth

## 2025-03-12 ENCOUNTER — OFFICE VISIT (OUTPATIENT)
Age: 35
End: 2025-03-12

## 2025-03-12 DIAGNOSIS — M79.7 FIBROMYALGIA: ICD-10-CM

## 2025-03-12 PROCEDURE — 99214 OFFICE O/P EST MOD 30 MIN: CPT | Performed by: PHYSICIAN ASSISTANT

## 2025-03-12 RX ORDER — PREGABALIN 100 MG/1
100 CAPSULE ORAL 3 TIMES DAILY
Qty: 90 CAPSULE | Refills: 2 | Status: SHIPPED | OUTPATIENT
Start: 2025-03-12 | End: 2025-06-10

## 2025-03-12 ASSESSMENT — ENCOUNTER SYMPTOMS
ALLERGIC/IMMUNOLOGIC NEGATIVE: 1
ABDOMINAL PAIN: 0
EYES NEGATIVE: 1
SHORTNESS OF BREATH: 0

## 2025-03-12 NOTE — PROGRESS NOTES
Ingrid is a follow-up for pain related to fibromyalgia.  She is on Lyrica which has been helpful but she is having increasing difficulty with potential for cost.  She was specifically looking to see if she could get any kind of patient assistance.  She is not sure that she has enough to even budget for $22 a month for medication.

## 2025-03-12 NOTE — PROGRESS NOTES
Chronic Pain Consult Note      Plan:    A comprehensive pain management plan may consist of the following: Testing, Therapy, Medications, Interventions, Consults, and Follow up.    Fibromyalgia  Patient is stable on 60mg Prozac daily which precludes addition of SNRI, therefore no Cymbalta or Effexor is recommended.   D/c Gabapentin entirely.   Continue Lyrica 100mg TID.  We could switch her to controlled release Lyrica if she qualifies through [employer] patient assistance program.  I gave her information to contact their patient assistance program to see if this is a possibility.  She is going to attempt to try to get it through good [facility] this month.  Spent time discussing Fibromyalgia and the recommendations of aerobic exercise - continue with Yoga and water therapy.   Other options for FMS treating include: Zonegran, Topamax, Savella, Elavil/Pamelor, etc. Savella may be cost prohibitive. We could additionally consider Low dose naltrexone (if she can afford it). We could also consider switch to Robaxin for its NMDA receptor activation.   Opioids are not indicated for fibromyalgia treatment but there are many other options we can explore.   Lupus  May need referral to alternative rheumatologist but there are no others covered under her sponsorship.   PTSD/Trauma  Recommend ongoing mental health counseling. We discussed how trauma results in chronic pain and how it is important to address mental health concerns in order to be the best she can be both mentally and physically.   Hx of substance abuse (meth/heroine)  Continue with Suboxone treatment. She is working on trying to get off of it. As we address her pain, she may continue to work with Dr Rivas to work on weaning her Suboxone.     General Recommendations: The pain condition that the patient suffers from is best treated with a multidisciplinary approach that involves an increase in physical activity to prevent de-conditioning and worsening of the pain cycle, as

## 2025-05-21 DIAGNOSIS — M79.7 FIBROMYALGIA: ICD-10-CM

## 2025-05-22 ENCOUNTER — OFFICE VISIT (OUTPATIENT)
Dept: BEHAVIORAL/MENTAL HEALTH CLINIC | Age: 35
End: 2025-05-22

## 2025-05-22 VITALS
OXYGEN SATURATION: 98 % | TEMPERATURE: 98.4 F | SYSTOLIC BLOOD PRESSURE: 118 MMHG | DIASTOLIC BLOOD PRESSURE: 80 MMHG | HEART RATE: 78 BPM

## 2025-05-22 DIAGNOSIS — F43.10 PTSD (POST-TRAUMATIC STRESS DISORDER): ICD-10-CM

## 2025-05-22 DIAGNOSIS — F43.12 NIGHTMARES ASSOCIATED WITH CHRONIC POST-TRAUMATIC STRESS DISORDER: ICD-10-CM

## 2025-05-22 DIAGNOSIS — F51.04 PSYCHOPHYSIOLOGICAL INSOMNIA: ICD-10-CM

## 2025-05-22 DIAGNOSIS — F41.9 ANXIETY: Primary | ICD-10-CM

## 2025-05-22 DIAGNOSIS — F51.5 NIGHTMARES ASSOCIATED WITH CHRONIC POST-TRAUMATIC STRESS DISORDER: ICD-10-CM

## 2025-05-22 PROCEDURE — 99215 OFFICE O/P EST HI 40 MIN: CPT | Performed by: NURSE PRACTITIONER

## 2025-05-22 RX ORDER — FLUOXETINE HYDROCHLORIDE 40 MG/1
40 CAPSULE ORAL DAILY
Qty: 90 CAPSULE | Refills: 1 | Status: SHIPPED | OUTPATIENT
Start: 2025-05-22 | End: 2025-11-18

## 2025-05-22 RX ORDER — PREGABALIN 100 MG/1
100 CAPSULE ORAL 3 TIMES DAILY
Qty: 90 CAPSULE | Refills: 2 | OUTPATIENT
Start: 2025-05-22 | End: 2025-08-20

## 2025-05-22 RX ORDER — PRAZOSIN HYDROCHLORIDE 5 MG/1
5 CAPSULE ORAL NIGHTLY
Qty: 90 CAPSULE | Refills: 1 | Status: SHIPPED | OUTPATIENT
Start: 2025-05-22 | End: 2025-11-18

## 2025-05-22 RX ORDER — FLUOXETINE HYDROCHLORIDE 40 MG/1
40 CAPSULE ORAL DAILY
Qty: 30 CAPSULE | Refills: 0 | Status: SHIPPED | OUTPATIENT
Start: 2025-05-22 | End: 2025-06-21

## 2025-05-22 RX ORDER — PRAZOSIN HYDROCHLORIDE 1 MG/1
1 CAPSULE ORAL NIGHTLY
Qty: 90 CAPSULE | Refills: 1 | Status: SHIPPED | OUTPATIENT
Start: 2025-05-22 | End: 2025-11-18

## 2025-05-22 ASSESSMENT — PATIENT HEALTH QUESTIONNAIRE - PHQ9
SUM OF ALL RESPONSES TO PHQ QUESTIONS 1-9: 3
SUM OF ALL RESPONSES TO PHQ QUESTIONS 1-9: 3
4. FEELING TIRED OR HAVING LITTLE ENERGY: SEVERAL DAYS
2. FEELING DOWN, DEPRESSED OR HOPELESS: NOT AT ALL
SUM OF ALL RESPONSES TO PHQ QUESTIONS 1-9: 3
8. MOVING OR SPEAKING SO SLOWLY THAT OTHER PEOPLE COULD HAVE NOTICED. OR THE OPPOSITE, BEING SO FIGETY OR RESTLESS THAT YOU HAVE BEEN MOVING AROUND A LOT MORE THAN USUAL: SEVERAL DAYS
4. FEELING TIRED OR HAVING LITTLE ENERGY: SEVERAL DAYS
3. TROUBLE FALLING OR STAYING ASLEEP: SEVERAL DAYS
1. LITTLE INTEREST OR PLEASURE IN DOING THINGS: NOT AT ALL
3. TROUBLE FALLING OR STAYING ASLEEP: SEVERAL DAYS
SUM OF ALL RESPONSES TO PHQ QUESTIONS 1-9: 3
1. LITTLE INTEREST OR PLEASURE IN DOING THINGS: NOT AT ALL
9. THOUGHTS THAT YOU WOULD BE BETTER OFF DEAD, OR OF HURTING YOURSELF: NOT AT ALL
5. POOR APPETITE OR OVEREATING: NOT AT ALL
7. TROUBLE CONCENTRATING ON THINGS, SUCH AS READING THE NEWSPAPER OR WATCHING TELEVISION: NOT AT ALL
9. THOUGHTS THAT YOU WOULD BE BETTER OFF DEAD, OR OF HURTING YOURSELF: NOT AT ALL
7. TROUBLE CONCENTRATING ON THINGS, SUCH AS READING THE NEWSPAPER OR WATCHING TELEVISION: NOT AT ALL
8. MOVING OR SPEAKING SO SLOWLY THAT OTHER PEOPLE COULD HAVE NOTICED. OR THE OPPOSITE - BEING SO FIDGETY OR RESTLESS THAT YOU HAVE BEEN MOVING AROUND A LOT MORE THAN USUAL: SEVERAL DAYS
5. POOR APPETITE OR OVEREATING: NOT AT ALL
SUM OF ALL RESPONSES TO PHQ QUESTIONS 1-9: 3
6. FEELING BAD ABOUT YOURSELF - OR THAT YOU ARE A FAILURE OR HAVE LET YOURSELF OR YOUR FAMILY DOWN: NOT AT ALL
2. FEELING DOWN, DEPRESSED OR HOPELESS: NOT AT ALL
6. FEELING BAD ABOUT YOURSELF - OR THAT YOU ARE A FAILURE OR HAVE LET YOURSELF OR YOUR FAMILY DOWN: NOT AT ALL
10. IF YOU CHECKED OFF ANY PROBLEMS, HOW DIFFICULT HAVE THESE PROBLEMS MADE IT FOR YOU TO DO YOUR WORK, TAKE CARE OF THINGS AT HOME, OR GET ALONG WITH OTHER PEOPLE: NOT DIFFICULT AT ALL
10. IF YOU CHECKED OFF ANY PROBLEMS, HOW DIFFICULT HAVE THESE PROBLEMS MADE IT FOR YOU TO DO YOUR WORK, TAKE CARE OF THINGS AT HOME, OR GET ALONG WITH OTHER PEOPLE: NOT DIFFICULT AT ALL

## 2025-05-22 ASSESSMENT — ANXIETY QUESTIONNAIRES
2. NOT BEING ABLE TO STOP OR CONTROL WORRYING: NOT AT ALL
1. FEELING NERVOUS, ANXIOUS, OR ON EDGE: SEVERAL DAYS
IF YOU CHECKED OFF ANY PROBLEMS ON THIS QUESTIONNAIRE, HOW DIFFICULT HAVE THESE PROBLEMS MADE IT FOR YOU TO DO YOUR WORK, TAKE CARE OF THINGS AT HOME, OR GET ALONG WITH OTHER PEOPLE: NOT DIFFICULT AT ALL
GAD7 TOTAL SCORE: 5
3. WORRYING TOO MUCH ABOUT DIFFERENT THINGS: SEVERAL DAYS
7. FEELING AFRAID AS IF SOMETHING AWFUL MIGHT HAPPEN: SEVERAL DAYS
6. BECOMING EASILY ANNOYED OR IRRITABLE: NOT AT ALL
4. TROUBLE RELAXING: SEVERAL DAYS
1. FEELING NERVOUS, ANXIOUS, OR ON EDGE: SEVERAL DAYS
4. TROUBLE RELAXING: SEVERAL DAYS
3. WORRYING TOO MUCH ABOUT DIFFERENT THINGS: SEVERAL DAYS
2. NOT BEING ABLE TO STOP OR CONTROL WORRYING: NOT AT ALL
6. BECOMING EASILY ANNOYED OR IRRITABLE: NOT AT ALL
7. FEELING AFRAID AS IF SOMETHING AWFUL MIGHT HAPPEN: SEVERAL DAYS
5. BEING SO RESTLESS THAT IT IS HARD TO SIT STILL: SEVERAL DAYS
IF YOU CHECKED OFF ANY PROBLEMS ON THIS QUESTIONNAIRE, HOW DIFFICULT HAVE THESE PROBLEMS MADE IT FOR YOU TO DO YOUR WORK, TAKE CARE OF THINGS AT HOME, OR GET ALONG WITH OTHER PEOPLE: NOT DIFFICULT AT ALL
5. BEING SO RESTLESS THAT IT IS HARD TO SIT STILL: SEVERAL DAYS
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
## 2025-06-09 PROBLEM — M79.7 FIBROMYALGIA: Status: ACTIVE | Noted: 2025-06-09

## 2025-06-10 ENCOUNTER — OFFICE VISIT (OUTPATIENT)
Age: 35
End: 2025-06-10

## 2025-06-10 DIAGNOSIS — M79.7 FIBROMYALGIA: Primary | ICD-10-CM

## 2025-06-10 PROCEDURE — 99214 OFFICE O/P EST MOD 30 MIN: CPT | Performed by: PHYSICIAN ASSISTANT

## 2025-06-10 RX ORDER — PREGABALIN 150 MG/1
150 CAPSULE ORAL 3 TIMES DAILY
Qty: 90 CAPSULE | Refills: 2 | Status: SHIPPED | OUTPATIENT
Start: 2025-06-10 | End: 2025-09-08

## 2025-06-10 ASSESSMENT — ENCOUNTER SYMPTOMS
SHORTNESS OF BREATH: 0
EYES NEGATIVE: 1
ALLERGIC/IMMUNOLOGIC NEGATIVE: 1
ABDOMINAL PAIN: 0

## 2025-06-10 NOTE — PROGRESS NOTES
Ingrid is a follow-up for chronic pain related to fibromyalgia.  She is on Lyrica 100 mg 3 times per day.  She did meet with GYN who has diagnosed her with persistent genital arousal disorder and recommended dose adjusting either her Prozac or Lyrica potentially.  GYN did send her an increase in Lyrica to 150 mg 3 times daily to trial.  She filled this on 5/29/2025.  She has also met with her new rheumatologist as Dr. Palacio has left.  She is now seeing Dr. Conti.  He did feel that she may have psoriatic arthritis given she has some sclerosis in the SI joint as well as rashes on her legs and her family history of psoriasis.  They discussed continuing her methotrexate but also adding Enbrel as well.  
be present.     Mally Silva PA-C under supervision of Dr Dru Foley    Date: Jessenia 10, 2025  Patient Name: Tiffanie Tejeda  MRN:[id number]  PCP: Chencho Jackson

## 2025-06-27 ENCOUNTER — OFFICE VISIT (OUTPATIENT)
Dept: FAMILY MEDICINE CLINIC | Facility: CLINIC | Age: 35
End: 2025-06-27

## 2025-06-27 ENCOUNTER — RESULTS FOLLOW-UP (OUTPATIENT)
Dept: FAMILY MEDICINE CLINIC | Facility: CLINIC | Age: 35
End: 2025-06-27

## 2025-06-27 VITALS
TEMPERATURE: 98.4 F | BODY MASS INDEX: 34.71 KG/M2 | OXYGEN SATURATION: 98 % | WEIGHT: 176.8 LBS | HEART RATE: 94 BPM | HEIGHT: 60 IN | DIASTOLIC BLOOD PRESSURE: 68 MMHG | SYSTOLIC BLOOD PRESSURE: 110 MMHG | RESPIRATION RATE: 16 BRPM

## 2025-06-27 DIAGNOSIS — T74.21XA SEXUAL ASSAULT OF ADULT, INITIAL ENCOUNTER: ICD-10-CM

## 2025-06-27 DIAGNOSIS — R30.0 DYSURIA: ICD-10-CM

## 2025-06-27 DIAGNOSIS — T74.21XA SEXUAL ASSAULT OF ADULT, INITIAL ENCOUNTER: Primary | ICD-10-CM

## 2025-06-27 LAB
BILIRUBIN, URINE, POC: NEGATIVE
BLOOD URINE, POC: NEGATIVE
GLUCOSE URINE, POC: NEGATIVE
HAV IGM SER QL: NONREACTIVE
HBV CORE IGM SER QL: NONREACTIVE
HBV SURFACE AG SER QL: NONREACTIVE
HCV AB SER QL: REACTIVE
HIV 1+2 AB+HIV1 P24 AG SERPL QL IA: NONREACTIVE
HIV 1/2 RESULT COMMENT: NORMAL
KETONES, URINE, POC: NEGATIVE
LEUKOCYTE ESTERASE, URINE, POC: NEGATIVE
NITRITE, URINE, POC: NEGATIVE
PH, URINE, POC: 7 (ref 4.6–8)
PROTEIN,URINE, POC: NEGATIVE
SPECIFIC GRAVITY, URINE, POC: 1.02 (ref 1–1.03)
T PALLIDUM AB SER QL IA: NONREACTIVE
URINALYSIS CLARITY, POC: NORMAL
URINALYSIS COLOR, POC: NORMAL
UROBILINOGEN, POC: NORMAL

## 2025-06-27 PROCEDURE — 81003 URINALYSIS AUTO W/O SCOPE: CPT | Performed by: FAMILY MEDICINE

## 2025-06-27 PROCEDURE — 99214 OFFICE O/P EST MOD 30 MIN: CPT | Performed by: FAMILY MEDICINE

## 2025-06-27 SDOH — ECONOMIC STABILITY: FOOD INSECURITY: WITHIN THE PAST 12 MONTHS, YOU WORRIED THAT YOUR FOOD WOULD RUN OUT BEFORE YOU GOT MONEY TO BUY MORE.: NEVER TRUE

## 2025-06-27 SDOH — ECONOMIC STABILITY: FOOD INSECURITY: WITHIN THE PAST 12 MONTHS, THE FOOD YOU BOUGHT JUST DIDN'T LAST AND YOU DIDN'T HAVE MONEY TO GET MORE.: NEVER TRUE
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
